# Patient Record
Sex: FEMALE | Race: WHITE | Employment: OTHER | URBAN - METROPOLITAN AREA
[De-identification: names, ages, dates, MRNs, and addresses within clinical notes are randomized per-mention and may not be internally consistent; named-entity substitution may affect disease eponyms.]

---

## 2017-05-16 ENCOUNTER — OFFICE VISIT (OUTPATIENT)
Dept: CARDIOLOGY CLINIC | Age: 77
End: 2017-05-16

## 2017-05-16 VITALS
SYSTOLIC BLOOD PRESSURE: 126 MMHG | RESPIRATION RATE: 18 BRPM | OXYGEN SATURATION: 93 % | DIASTOLIC BLOOD PRESSURE: 80 MMHG | HEART RATE: 62 BPM | WEIGHT: 245 LBS | HEIGHT: 63 IN | BODY MASS INDEX: 43.41 KG/M2

## 2017-05-16 DIAGNOSIS — G47.30 SLEEP APNEA, UNSPECIFIED TYPE: ICD-10-CM

## 2017-05-16 DIAGNOSIS — E78.5 DYSLIPIDEMIA: Primary | ICD-10-CM

## 2017-05-16 DIAGNOSIS — I25.10 ATHEROSCLEROSIS OF NATIVE CORONARY ARTERY OF NATIVE HEART WITHOUT ANGINA PECTORIS: ICD-10-CM

## 2017-05-16 NOTE — MR AVS SNAPSHOT
Visit Information Date & Time Provider Department Dept. Phone Encounter #  
 5/16/2017  3:20 PM Emely Samson MD [facility] Hang [phone] [id number] Follow-up Instructions Return in about 6 months (around 11/16/2017). Follow-up and Disposition History Your Appointments 5/16/2017  3:20 PM  
ESTABLISHED PATIENT with Emely Samson MD  
[facility] (JEAN CLAUDE SCHEDULING) Appt Note: annual cardiac checkup [address] [facility] [address] 100[address] [facility]  
54 [facility] [address] [address] Upcoming Health Maintenance Date Due DTaP/Tdap/Td series (1 - Tdap) 2/6/1961 ZOSTER VACCINE AGE 60> 2/6/2000 GLAUCOMA SCREENING Q2Y 2/6/2005 OSTEOPOROSIS SCREENING (DEXA) 2/6/2005 Pneumococcal 65+ Low/Medium Risk (1 of 2 - PCV13) 2/6/2005 MEDICARE YEARLY EXAM 2/6/2005 INFLUENZA AGE 9 TO ADULT 8/1/2017 Allergies as of 5/16/2017  Review Complete On: 5/16/2017 By: Emely Samson MD  
 No Known Allergies Current Immunizations  Never Reviewed No immunizations on file. Not reviewed this visit You Were Diagnosed With   
  
 Codes Comments Dyslipidemia    -  Primary ICD-10-CM: E78.5 ICD-9-CM: 272.4 Atherosclerosis of native coronary artery of native heart without angina pectoris     ICD-10-CM: I25.10 ICD-9-CM: 414.01 Sleep apnea, unspecified type     ICD-10-CM: G47.30 ICD-9-CM: 780.57 Vitals BP Pulse Resp Height(growth percentile) Weight(growth percentile) SpO2  
 126/80 (BP 1 Location: Left arm, BP Patient Position: Sitting) 62 18 5' 3\" (1.6 m) 245 lb (111.1 kg) 93% BMI Smoking Status 43.4 kg/m2 Never Smoker Vitals History BMI and BSA Data Body Mass Index Body Surface Area  
 43.4 kg/m 2 2.22 m 2 Preferred Pharmacy Pharmacy Name Phone [address] [address] [phone] Your Updated Medication List  
  
   
This list is accurate as of: 5/16/17 11:16 AM.  Always use your most recent med list. amLODIPine 2.5 mg tablet Commonly known as:  Heidi David Take 1 Tab by mouth daily. aspirin 81 mg chewable tablet Take 81 mg by mouth daily. BIOTENE Tpst  
Generic drug:  sod mfp-glu yjr-jzsjqyju-rcaps  
by Dental route. cpap machine kit  
by Does Not Apply route. metoprolol succinate 50 mg XL tablet Commonly known as:  TOPROL XL Take 1 Tab by mouth daily. multivitamin capsule Take 1 Cap by mouth daily. NASONEX 50 mcg/actuation nasal spray Generic drug:  mometasone 2 Sprays daily. OSTEO BI-FLEX PO Take  by mouth. REFRESH OPTIVE 0.5-0.9 % Drop Generic drug:  Carboxymethylcellulose-Glycern Apply  to eye. instill 2-3 times daily in both eyes  
  
 rosuvastatin 10 mg tablet Commonly known as:  CRESTOR Take 1 Tab by mouth nightly. VOLTAREN 1 % Gel Generic drug:  diclofenac Apply 4 g to affected area three (3) times daily as needed. We Performed the Following HEPATIC FUNCTION PANEL [92361 CPT(R)] LIPID PANEL [23841 CPT(R)] Follow-up Instructions Return in about 6 months (around 11/16/2017). Introducing [facility] [place] [facility]! Fatimah Vasquez introduces [employer] patient portal. Now you can access parts of your medical record, email your doctor's office, and request medication refills online. 1. In your internet browser, go to [email or web address] 2. Click on the First Time User? Click Here link in the Sign In box. You will see the New Member Sign Up page. 3. Enter your [employer] Access Code exactly as it appears below. You will not need to use this code after youve completed the sign-up process. If you do not sign up before the expiration date, you must request a new code. · [employer] Access Code: [email or web address] Expires: 8/14/2017 11:16 AM 
 
4. Enter the last four digits of your Social Security Number (xxxx) and Date of Birth (mm/dd/yyyy) as indicated and click Submit. You will be taken to the next sign-up page. 5. Create a [employer] ID. This will be your [employer] login ID and cannot be changed, so think of one that is secure and easy to remember. 6. Create a [employer] password. You can change your password at any time. 7. Enter your Password Reset Question and Answer. This can be used at a later time if you forget your password. 8. Enter your e-mail address. You will receive e-mail notification when new information is available in [address] 9. Click Sign Up. You can now view and download portions of your medical record. 10. Click the Download Summary menu link to download a portable copy of your medical information. If you have questions, please visit the Frequently Asked Questions section of the [employer] website. Remember, [employer] is NOT to be used for urgent needs. For medical emergencies, dial 911. Now available from your iPhone and Android! Please provide this summary of care documentation to your next provider. Your primary care clinician is listed as Devyn Peck. If you have any questions after today's visit, please call [phone].

## 2017-05-16 NOTE — PROGRESS NOTES
LAST OFFICE VISIT : 12/5/2016        ICD-10-CM ICD-9-CM   1. Dyslipidemia E78.5 272.4   2. Atherosclerosis of native coronary artery of native heart without angina pectoris I25.10 414.01   3. Sleep apnea, unspecified type G47.30 780.57            Elsie Armas is a 68 y.o. female with hypertension and hyperlipidemia referred for annual follow up. Cardiac risk factors: family history, dyslipidemia, obesity, sedentary life style, hypertension, post-menopausal.  I have personally obtained the history from the patient. HISTORY OF PRESENTING ILLNESS      She is doing well with no cardiac complaints today. She is compliant with CPAP. She monitors her diet. The patient denies chest pain/ shortness of breath, orthopnea, PND, LE edema, palpitations, syncope, presyncope or fatigue. ACTIVE PROBLEM LIST     Patient Active Problem List    Diagnosis Date Noted    Dyslipidemia 12/10/2015    Coronary atherosclerosis of native coronary artery 03/31/2011    Essential hypertension, benign 03/31/2011    Morbid obesity ([address] 03/31/2011    Sleep apnea 03/31/2011    Nonspecific abnormal unspecified cardiovascular function study 03/31/2011           PAST MEDICAL HISTORY     Past Medical History:   Diagnosis Date    Bronchitis     Coronary atherosclerosis of native coronary artery     Essential hypertension, benign     Unspecified sleep apnea            PAST SURGICAL HISTORY     Past Surgical History:   Procedure Laterality Date    ECHO 2D ADULT  2007    LVH, normal LV wall motion and ejection fraction, normal chamber dimensions, mild aortic regurgitation and mild mitral regurgitation. The ejection fraction was 60-65%    HX CHOLECYSTECTOMY      HX HEART CATHETERIZATION  2007    Left ventricular wall motion is normal. Ejection Fraction is estimated at 60%. The catheterization showed non-ostructive coronary artery disease. Minimal plaquing.      STRESS TEST MYOVIEW  2007    reversible defect(s) involving the anterior wall. Gated EF (%): 63          ALLERGIES     No Known Allergies       FAMILY HISTORY     History reviewed. No pertinent family history. negative for cardiac disease       SOCIAL HISTORY     Social History     Social History    Marital status:      Spouse name: N/A    Number of children: N/A    Years of education: N/A     Social History Main Topics    Smoking status: Never Smoker    Smokeless tobacco: None    Alcohol use No    Drug use: None    Sexual activity: Yes     Partners: Male     Other Topics Concern    None     Social History Narrative         MEDICATIONS     Current Outpatient Prescriptions   Medication Sig    rosuvastatin (CRESTOR) 10 mg tablet Take 1 Tab by mouth nightly.  amLODIPine (NORVASC) 2.5 mg tablet Take 1 Tab by mouth daily.  metoprolol succinate (TOPROL XL) 50 mg XL tablet Take 1 Tab by mouth daily.  GLUCOSAMINE HCL/CHONDR STONER A NA (OSTEO BI-FLEX PO) Take  by mouth.  mometasone (NASONEX) 50 mcg/actuation nasal spray 2 Sprays daily.  Carboxymethylcellulose-Glycern (REFRESH OPTIVE) 0.5-0.9 % drop Apply  to eye. instill 2-3 times daily in both eyes    diclofenac (VOLTAREN) 1 % topical gel Apply 4 g to affected area three (3) times daily as needed.  sod mfp-glu ohe-pcbeklpo-xkumk (BIOTENE) tpst by Dental route.  cpap machine kit by Does Not Apply route.  multivitamin capsule Take 1 Cap by mouth daily.  aspirin 81 mg chewable tablet Take 81 mg by mouth daily. No current facility-administered medications for this visit. I have reviewed the nurses notes, vitals, problem list, allergy list, medical history, family, social history and medications. REVIEW OF SYMPTOMS      General: Pt denies excessive weight gain or loss. Pt is able to conduct ADL's  HEENT: Denies blurred vision, headaches, hearing loss, epistaxis and difficulty swallowing.   Respiratory: Denies cough, congestion, shortness of breath, AGUILAR, wheezing or stridor. Cardiovascular: Denies precordial pain, palpitations, edema or PND  Gastrointestinal: Denies poor appetite, indigestion, abdominal pain or blood in stool  Genitourinary: Denies hematuria, dysuria, increased urinary frequency  Musculoskeletal: Denies joint pain or swelling from muscles or joints  Neurologic: Denies tremor, paresthesias, headache, or sensory motor disturbance  Psychiatric: Denies confusion, insomnia, depression  Integumentray: Denies rash, itching or ulcers. Hematologic: Denies easy bruising, bleeding     PHYSICAL EXAMINATION      Vitals:    05/16/17 1040   BP: 126/80   Pulse: 62   Resp: 18   SpO2: 93%   Weight: 245 lb (111.1 kg)   Height: 5' 3\" (1.6 m)     General: Well developed, in no acute distress. HEENT: No jaundice, oral mucosa moist, no oral ulcers  Neck: Supple, no stiffness, no lymphadenopathy, supple  Heart:  Normal S1/S2 negative S3 or S4. Regular, no murmur, gallop or rub, no jugular venous distention  Respiratory: Clear bilaterally x 4, no wheezing or rales  Extremities:  No edema, normal cap refill, no cyanosis. Musculoskeletal: No clubbing, no deformities  Neuro: A&Ox3, speech clear, gait stable, cooperative, no focal neurologic deficits  Skin: Skin color is normal. No rashes or lesions. Non diaphoretic, moist.  Vascular: 2+ pulses symmetric in all extremities           DIAGNOSTIC DATA     1. Lipids  5/27/15- , HDL 51, LDL 90,   12/2/15- , HDL 49, ,   8/31/16- , HDL 49, ,   12/5/16- , HDL 53, LDL 62,     2. Echo  10/2/12- EF 60%, LAE, TR mild  12/5/16- EF 65%, AI mild    3. Cardiolite  10/2/12 -no ischemia    4. Cardiac catheterization   ( 2007 )EF 60% with minimal plaquing      5.  LE Venous Doppler 4/14/14  MILD LEFT LEG VENOUS INSUFFICIENCY       LABORATORY DATA          No results found for: WBC, HGBPOC, HGB, HGBP, HCTPOC, HCT, PHCT, RBCH, PLT, MCV, HGBEXT, HCTEXT, PLTEXT, HGBEXT, HCTEXT, PLTEXT   Lab Results Component Value Date/Time    Sodium 143 11/09/2010 11:05 AM    Potassium 4.6 11/09/2010 11:05 AM    Chloride 104 11/09/2010 11:05 AM    CO2 33 11/09/2010 11:05 AM    Anion gap 6 11/09/2010 11:05 AM    Glucose 108 11/09/2010 11:05 AM    BUN 11 11/09/2010 11:05 AM    Creatinine 0.7 11/09/2010 11:05 AM    BUN/Creatinine ratio 16 11/09/2010 11:05 AM    GFR est AA >60 11/09/2010 11:05 AM    GFR est non-AA >60 11/09/2010 11:05 AM    Calcium 8.7 11/09/2010 11:05 AM    Bilirubin, total 0.6 12/05/2016 12:53 PM    AST (SGOT) 21 12/05/2016 12:53 PM    Alk. phosphatase 73 12/05/2016 12:53 PM    Protein, total 6.1 12/05/2016 12:53 PM    Albumin 3.9 12/05/2016 12:53 PM    Globulin 2.7 07/22/2010 01:59 PM    A-G Ratio 1.4 07/22/2010 01:59 PM    ALT (SGPT) 18 12/05/2016 12:53 PM           ASSESSMENT/RECOMMENDATIONS:.      1. GEMMA on CPAP  -is continuing to wear CPAP although she does state she gets tired but does not go to sleep until 1-2 AM    2. Dyslipidemia  -lipids have been at goal on current medical regimen  -I have given her lab slip to get cholesterol checked again     3. HTN  -BP under good control today    4. Age management  -discussed with her diet, exercise, and the importance of losing weight    5. Follow up in 6 months or PRN      Orders Placed This Encounter    HEPATIC FUNCTION PANEL    LIPID PANEL        Follow-up Disposition:  Return in about 6 months (around 11/16/2017). I have discussed the diagnosis with  Dae Huitron and the intended plan as seen in the above orders. Questions were answered concerning future plans. I have discussed medication side effects and warnings with the patient as well. Thank you,  Piyush Balderas MD for involving me in the care of  Dae Huitron. Please do not hesitate to contact me for further questions/concerns. This note was written by nick Ramsey, as dictated by Arminda Fuentes MD.      Liv Thompson. MD [place], [address] [facility] [address] [facility], [address] [place] [address]     Kaveh Guzman [address]      [phone] / [phone] Fax

## 2017-05-16 NOTE — COMMUNICATION BODY
Walter Macdonald,    I had the opportunity of seeing Ms. Lakshmi Bush in the office today. She has lost 7 lbs. Appears deconditioned and I am unsure about if she is truly active or proactive regarding her health. She is tired a lot but will go to bed late at night. She is wearing her CPAP regularly. I will check her FLP but no other cardiac testing today.     All the best,    [facility]

## 2017-05-16 NOTE — PROGRESS NOTES
Visit Vitals    /80 (BP 1 Location: Left arm, BP Patient Position: Sitting)    Pulse 62    Resp 18    Ht 5' 3\" (1.6 m)    Wt 245 lb (111.1 kg)    SpO2 93%    BMI 43.4 kg/m2     Chief Complaint   Patient presents with    Check Up     cardiac check up    Hypertension     No refill, No complaints

## 2017-05-17 LAB
ALBUMIN SERPL-MCNC: 4 G/DL (ref 3.5–4.8)
ALP SERPL-CCNC: 78 IU/L (ref 39–117)
ALT SERPL-CCNC: 15 IU/L (ref 0–32)
AST SERPL-CCNC: 20 IU/L (ref 0–40)
BILIRUB DIRECT SERPL-MCNC: 0.18 MG/DL (ref 0–0.4)
BILIRUB SERPL-MCNC: 0.6 MG/DL (ref 0–1.2)
CHOLEST SERPL-MCNC: 132 MG/DL (ref 100–199)
HDLC SERPL-MCNC: 50 MG/DL
INTERPRETATION, 910389: NORMAL
LDLC SERPL CALC-MCNC: 61 MG/DL (ref 0–99)
PROT SERPL-MCNC: 6 G/DL (ref 6–8.5)
TRIGL SERPL-MCNC: 105 MG/DL (ref 0–149)
VLDLC SERPL CALC-MCNC: 21 MG/DL (ref 5–40)

## 2017-05-23 ENCOUNTER — TELEPHONE (OUTPATIENT)
Dept: CARDIOLOGY CLINIC | Age: 77
End: 2017-05-23

## 2017-05-23 DIAGNOSIS — E78.00 ELEVATED CHOLESTEROL: Primary | ICD-10-CM

## 2017-09-29 ENCOUNTER — TELEPHONE (OUTPATIENT)
Dept: CARDIOLOGY CLINIC | Age: 77
End: 2017-09-29

## 2017-09-29 NOTE — TELEPHONE ENCOUNTER
Pt called back and mentioned she had not had anything to drink today but she did take her meds. She also stated that she does not have a BP cuff at home. I advised her to drink something and hydrate herself. If she wanted she could come by the office and I could take her pressure for her or she could go to another pharmacy and check her pressure there. Pt confirmed understanding. Pt was asymptomatic.

## 2017-09-29 NOTE — TELEPHONE ENCOUNTER
Patient states that she checked her bp while at the pharmacy and it was 196/86. Requests a call back at [phone]. Thanks!

## 2017-09-29 NOTE — TELEPHONE ENCOUNTER
Called pt and left message. Asked her:    Was she hydrated? Was the cuff automatic? Did you take it correctly with legs flat on floor and posture erect? Did you take your meds? Are you stressed? Can you check at home? If so, take a log over the weekend. Take it twice a day and call us back on Monday. If she feels it is really urgent she can make a BP check appt on today.

## 2017-10-23 RX ORDER — METOPROLOL SUCCINATE 50 MG/1
50 TABLET, EXTENDED RELEASE ORAL DAILY
Qty: 90 TAB | Refills: 1 | Status: SHIPPED | OUTPATIENT
Start: 2017-10-23 | End: 2018-05-30 | Stop reason: SDUPTHER

## 2017-11-08 ENCOUNTER — OFFICE VISIT (OUTPATIENT)
Dept: CARDIOLOGY CLINIC | Age: 77
End: 2017-11-08

## 2017-11-08 VITALS
OXYGEN SATURATION: 96 % | HEIGHT: 63 IN | SYSTOLIC BLOOD PRESSURE: 130 MMHG | WEIGHT: 236.4 LBS | BODY MASS INDEX: 41.89 KG/M2 | HEART RATE: 56 BPM | DIASTOLIC BLOOD PRESSURE: 60 MMHG

## 2017-11-08 DIAGNOSIS — E78.5 DYSLIPIDEMIA: ICD-10-CM

## 2017-11-08 DIAGNOSIS — G47.33 OBSTRUCTIVE SLEEP APNEA SYNDROME: ICD-10-CM

## 2017-11-08 DIAGNOSIS — I25.10 ATHEROSCLEROSIS OF NATIVE CORONARY ARTERY OF NATIVE HEART WITHOUT ANGINA PECTORIS: Primary | ICD-10-CM

## 2017-11-08 DIAGNOSIS — I10 ESSENTIAL HYPERTENSION, BENIGN: ICD-10-CM

## 2017-11-08 NOTE — PROGRESS NOTES
Visit Vitals    /60 (BP 1 Location: Left arm)    Pulse (!) 56    Ht 5' 3\" (1.6 m)    Wt 236 lb 6.4 oz (107.2 kg)    SpO2 96%    BMI 41.88 kg/m2

## 2017-11-08 NOTE — PROGRESS NOTES
LAST OFFICE VISIT : 5/16/2017        ICD-10-CM ICD-9-CM   1. Atherosclerosis of native coronary artery of native heart without angina pectoris I25.10 414.01   2. Essential hypertension, benign I10 401.1   3. Dyslipidemia E78.5 272.4   4. Obstructive sleep apnea syndrome G47.33 327.23        Miguel Angel Mobley is a 68 y.o. female with  with hypertension and hyperlipidemia referred for 6 month follow up.       Cardiac risk factors: family history, dyslipidemia, obesity, sedentary life style, hypertension, post-menopausal.I have personally obtained the history from the patient. Rochelle Evans has lost almost 10 lbs since last visit by not eating as much. She does not regularly exercise but occasionally walks with no exertional sxs. The patient denies chest pain/ shortness of breath, orthopnea, PND, LE edema, palpitations, syncope, presyncope or fatigue. ACTIVE PROBLEM LIST     Patient Active Problem List    Diagnosis Date Noted    Dyslipidemia 12/10/2015    Coronary atherosclerosis of native coronary artery 03/31/2011    Essential hypertension, benign 03/31/2011    Morbid obesity ([address] 03/31/2011    Sleep apnea 03/31/2011    Nonspecific abnormal unspecified cardiovascular function study 03/31/2011           PAST MEDICAL HISTORY     Past Medical History:   Diagnosis Date    Bronchitis     Coronary atherosclerosis of native coronary artery     Essential hypertension, benign     Unspecified sleep apnea            PAST SURGICAL HISTORY     Past Surgical History:   Procedure Laterality Date    ECHO 2D ADULT  2007    LVH, normal LV wall motion and ejection fraction, normal chamber dimensions, mild aortic regurgitation and mild mitral regurgitation. The ejection fraction was 60-65%    HX CHOLECYSTECTOMY      HX HEART CATHETERIZATION  2007    Left ventricular wall motion is normal. Ejection Fraction is estimated at 60%.  The catheterization showed non-ostructive coronary artery disease. Minimal plaquing.  STRESS TEST MYOVIEW  2007    reversible defect(s) involving the anterior wall. Gated EF (%): 63          ALLERGIES     No Known Allergies       FAMILY HISTORY     No family history on file. negative for cardiac disease       SOCIAL HISTORY     Social History     Social History    Marital status:      Spouse name: N/A    Number of children: N/A    Years of education: N/A     Social History Main Topics    Smoking status: Never Smoker    Smokeless tobacco: Never Used    Alcohol use No    Drug use: None    Sexual activity: Yes     Partners: Male     Other Topics Concern    None     Social History Narrative         MEDICATIONS     Current Outpatient Prescriptions   Medication Sig    metoprolol succinate (TOPROL XL) 50 mg XL tablet Take 1 Tab by mouth daily.  rosuvastatin (CRESTOR) 10 mg tablet Take 1 Tab by mouth nightly.  amLODIPine (NORVASC) 2.5 mg tablet Take 1 Tab by mouth daily.  GLUCOSAMINE HCL/CHONDR STONER A NA (OSTEO BI-FLEX PO) Take  by mouth as needed.  mometasone (NASONEX) 50 mcg/actuation nasal spray 2 Sprays daily.  Carboxymethylcellulose-Glycern (REFRESH OPTIVE) 0.5-0.9 % drop Apply  to eye. instill 2-3 times daily in both eyes    diclofenac (VOLTAREN) 1 % topical gel Apply 4 g to affected area three (3) times daily as needed.  sod mfp-glu plg-nuudmuui-aamrp (BIOTENE) tpst by Dental route.  cpap machine kit by Does Not Apply route.  multivitamin capsule Take 1 Cap by mouth daily.  aspirin 81 mg chewable tablet Take 81 mg by mouth daily. No current facility-administered medications for this visit. I have reviewed the nurses notes, vitals, problem list, allergy list, medical history, family, social history and medications. REVIEW OF SYMPTOMS      General: Pt denies excessive weight gain or loss.  Pt is able to conduct ADL's  HEENT: Denies blurred vision, headaches, hearing loss, epistaxis and difficulty swallowing. Respiratory: Denies cough, congestion, shortness of breath, AGUILAR, wheezing or stridor. Cardiovascular: Denies precordial pain, palpitations, edema or PND  Gastrointestinal: Denies poor appetite, indigestion, abdominal pain or blood in stool  Genitourinary: Denies hematuria, dysuria, increased urinary frequency  Musculoskeletal: Denies joint pain or swelling from muscles or joints  Neurologic: Denies tremor, paresthesias, headache, or sensory motor disturbance  Psychiatric: Denies confusion, insomnia, depression  Integumentray: Denies rash, itching or ulcers. Hematologic: Denies easy bruising, bleeding     PHYSICAL EXAMINATION      Vitals:    11/08/17 1442   BP: 130/60   Pulse: (!) 56   SpO2: 96%   Weight: 236 lb 6.4 oz (107.2 kg)   Height: 5' 3\" (1.6 m)     General: Well developed, in no acute distress. HEENT: No jaundice, oral mucosa moist, no oral ulcers  Neck: Supple, no stiffness, no lymphadenopathy, supple  Heart:  Normal S1/S2 negative S3 or S4. Regular, no murmur, gallop or rub, no jugular venous distention  Respiratory: Clear bilaterally x 4, no wheezing or rales  Extremities:  No edema, normal cap refill, no cyanosis. Musculoskeletal: No clubbing, no deformities  Neuro: A&Ox3, speech clear, gait stable, cooperative, no focal neurologic deficits  Skin: Skin color is normal. No rashes or lesions. Non diaphoretic, moist.          EKG: SB     DIAGNOSTIC DATA     1. Lipids  5/27/15- , HDL 51, LDL 90,   12/2/15- , HDL 49, ,   8/31/16- , HDL 49, ,   12/5/16- , HDL 53, LDL 62,   5/16/17- , HDL 50, LDL 61,   8/30/17- , HDL 48, ,     2. Echo  10/2/12- EF 60%, LAE, TR mild  12/5/16- EF 65%, AI mild    3. Cardiolite  10/2/12 -no ischemia    4. Cardiac catheterization   ( 2007 )EF 60% with minimal plaquing      5.  LE Venous Doppler 4/14/14  MILD LEFT LEG VENOUS INSUFFICIENCY         LABORATORY DATA          No results found for: WBC, HGBPOC, HGB, HGBP, HCTPOC, HCT, PHCT, RBCH, PLT, MCV, HGBEXT, HCTEXT, PLTEXT, HGBEXT, HCTEXT, PLTEXT   Lab Results   Component Value Date/Time    Sodium 143 11/09/2010 11:05 AM    Potassium 4.6 11/09/2010 11:05 AM    Chloride 104 11/09/2010 11:05 AM    CO2 33 11/09/2010 11:05 AM    Anion gap 6 11/09/2010 11:05 AM    Glucose 108 11/09/2010 11:05 AM    BUN 11 11/09/2010 11:05 AM    Creatinine 0.7 11/09/2010 11:05 AM    BUN/Creatinine ratio 16 11/09/2010 11:05 AM    GFR est AA >60 11/09/2010 11:05 AM    GFR est non-AA >60 11/09/2010 11:05 AM    Calcium 8.7 11/09/2010 11:05 AM    Bilirubin, total 0.6 05/16/2017 11:31 AM    AST (SGOT) 20 05/16/2017 11:31 AM    Alk. phosphatase 78 05/16/2017 11:31 AM    Protein, total 6.0 05/16/2017 11:31 AM    Albumin 4.0 05/16/2017 11:31 AM    Globulin 2.7 07/22/2010 01:59 PM    A-G Ratio 1.4 07/22/2010 01:59 PM    ALT (SGPT) 15 05/16/2017 11:31 AM           ASSESSMENT/RECOMMENDATIONS:.      1. Dyslipidemia  -lipids at goal. Followed by PCP     2. HTN  -BP under good control today but HR slightly slow on Metoprolol but she is asx. Will hold off on changing dose until she becomes symptomatic.      3. GEMMA on CPAP  -is continuing to wear CPAP although she does state she gets tired but does not go to sleep until 1-2 AM     4. Follow up in 6 months or PRN    Orders Placed This Encounter    AMB POC EKG ROUTINE W/ 12 LEADS, INTER & REP     Order Specific Question:   Reason for Exam:     Answer:   CAD        Follow-up Disposition:  Return in about 6 months (around 5/8/2018). I have discussed the diagnosis with  Anand Mccarthy and the intended plan as seen in the above orders. Questions were answered concerning future plans. I have discussed medication side effects and warnings with the patient as well. Thank you,  Hope Ceja MD for involving me in the care of  Anand Mccarthy. Please do not hesitate to contact me for further questions/concerns.      Written by Lindsay Irvin, dictated by Rabia Mcgee MD.    Nicanor Okeefe MD, [address] [place] [address] Rd., [address]      [facility], [address] [place] [address], [facility] [address]. Jacobo Moura.      [phone] / [phone] Fax

## 2017-11-08 NOTE — MR AVS SNAPSHOT
Visit Information Date & Time Provider Department Dept. Phone Encounter #  
 11/8/2017  2:40 PM Darrian Tena MD [facility] Juany Romero [phone] [id number] Follow-up Instructions Return in about 6 months (around 5/8/2018). Upcoming Health Maintenance Date Due DTaP/Tdap/Td series (1 - Tdap) 2/6/1961 ZOSTER VACCINE AGE 60> 12/6/1999 GLAUCOMA SCREENING Q2Y 2/6/2005 OSTEOPOROSIS SCREENING (DEXA) 2/6/2005 Pneumococcal 65+ Low/Medium Risk (1 of 2 - PCV13) 2/6/2005 MEDICARE YEARLY EXAM 2/6/2005 Influenza Age 5 to Adult 8/1/2017 Allergies as of 11/8/2017  Review Complete On: 11/8/2017 By: Darrian Tena MD  
 No Known Allergies Current Immunizations  Never Reviewed No immunizations on file. Not reviewed this visit You Were Diagnosed With   
  
 Codes Comments Atherosclerosis of native coronary artery of native heart without angina pectoris    -  Primary ICD-10-CM: I25.10 ICD-9-CM: 414.01 Essential hypertension, benign     ICD-10-CM: I10 
ICD-9-CM: 401.1 Dyslipidemia     ICD-10-CM: E78.5 ICD-9-CM: 272.4 Obstructive sleep apnea syndrome     ICD-10-CM: G47.33 
ICD-9-CM: 327.23 Vitals BP Pulse Height(growth percentile) Weight(growth percentile) SpO2 BMI  
 130/60 (BP 1 Location: Left arm) (!) 56 5' 3\" (1.6 m) 236 lb 6.4 oz (107.2 kg) 96% 41.88 kg/m2 Smoking Status Never Smoker Vitals History BMI and BSA Data Body Mass Index Body Surface Area  
 41.88 kg/m 2 2.18 m 2 Preferred Pharmacy Pharmacy Name Phone [address] [facility]  [facility] [address] [phone] Your Updated Medication List  
  
   
This list is accurate as of: 11/8/17  3:09 PM.  Always use your most recent med list. amLODIPine 2.5 mg tablet Commonly known as:  Uri Morgan Take 1 Tab by mouth daily. aspirin 81 mg chewable tablet Take 81 mg by mouth daily. BIOTENE Tpst  
Generic drug:  sod mfp-glu [id number]-jtmhz  
by Dental route. cpap machine kit  
by Does Not Apply route. metoprolol succinate 50 mg XL tablet Commonly known as:  TOPROL XL Take 1 Tab by mouth daily. multivitamin capsule Take 1 Cap by mouth daily. NASONEX 50 mcg/actuation nasal spray Generic drug:  mometasone 2 Sprays daily. OSTEO BI-FLEX PO Take  by mouth as needed. REFRESH OPTIVE 0.5-0.9 % Drop Generic drug:  Carboxymethylcellulose-Glycern Apply  to eye. instill 2-3 times daily in both eyes  
  
 rosuvastatin 10 mg tablet Commonly known as:  CRESTOR Take 1 Tab by mouth nightly. VOLTAREN 1 % Gel Generic drug:  diclofenac Apply 4 g to affected area three (3) times daily as needed. We Performed the Following AMB POC EKG ROUTINE W/ 12 LEADS, INTER & REP [24784 CPT(R)] Follow-up Instructions Return in about 6 months (around 5/8/2018). Introducing hospitals & HEALTH SERVICES! Hemant Garcia introduces ClaimKit patient portal. Now you can access parts of your medical record, email your doctor's office, and request medication refills online. 1. In your internet browser, go to [email or web address] 2. Click on the First Time User? Click Here link in the Sign In box. You will see the New Member Sign Up page. 3. Enter your ClaimKit Access Code exactly as it appears below. You will not need to use this code after youve completed the sign-up process. If you do not sign up before the expiration date, you must request a new code. · ClaimKit Access Code: [email or web address] Expires: 2/6/2018  2:28 PM 
 
4. Enter the last four digits of your Social Security Number (xxxx) and Date of Birth (mm/dd/yyyy) as indicated and click Submit. You will be taken to the next sign-up page. 5. Create a ClaimKit ID.  This will be your ClaimKit login ID and cannot be changed, so think of one that is secure and easy to remember. 6. Create a NEBOTRADE password. You can change your password at any time. 7. Enter your Password Reset Question and Answer. This can be used at a later time if you forget your password. 8. Enter your e-mail address. You will receive e-mail notification when new information is available in [address] 9. Click Sign Up. You can now view and download portions of your medical record. 10. Click the Download Summary menu link to download a portable copy of your medical information. If you have questions, please visit the Frequently Asked Questions section of the NEBOTRADE website. Remember, NEBOTRADE is NOT to be used for urgent needs. For medical emergencies, dial 911. Now available from your iPhone and Android! Please provide this summary of care documentation to your next provider. Your primary care clinician is listed as Lorrie Tong. If you have any questions after today's visit, please call [phone].

## 2017-12-12 RX ORDER — AMLODIPINE BESYLATE 2.5 MG/1
2.5 TABLET ORAL DAILY
Qty: 90 TAB | Refills: 3 | Status: SHIPPED | OUTPATIENT
Start: 2017-12-12

## 2018-05-07 ENCOUNTER — TELEPHONE (OUTPATIENT)
Dept: CARDIOLOGY CLINIC | Age: 78
End: 2018-05-07

## 2018-05-07 NOTE — TELEPHONE ENCOUNTER
Patient is unsure if she was supposed to get labwork done prior to her appt. 5/9 She can be reached at [phone].  Lisandro garcia

## 2018-05-09 ENCOUNTER — OFFICE VISIT (OUTPATIENT)
Dept: CARDIOLOGY CLINIC | Age: 78
End: 2018-05-09

## 2018-05-09 VITALS
BODY MASS INDEX: 41.64 KG/M2 | HEART RATE: 59 BPM | OXYGEN SATURATION: 98 % | HEIGHT: 63 IN | DIASTOLIC BLOOD PRESSURE: 82 MMHG | SYSTOLIC BLOOD PRESSURE: 130 MMHG | WEIGHT: 235 LBS | RESPIRATION RATE: 18 BRPM

## 2018-05-09 DIAGNOSIS — I10 ESSENTIAL HYPERTENSION, BENIGN: ICD-10-CM

## 2018-05-09 DIAGNOSIS — I25.10 ATHEROSCLEROSIS OF NATIVE CORONARY ARTERY OF NATIVE HEART WITHOUT ANGINA PECTORIS: Primary | ICD-10-CM

## 2018-05-09 DIAGNOSIS — E78.5 DYSLIPIDEMIA: ICD-10-CM

## 2018-05-09 DIAGNOSIS — E66.01 MORBID OBESITY (HCC): ICD-10-CM

## 2018-05-09 DIAGNOSIS — G47.33 OBSTRUCTIVE SLEEP APNEA SYNDROME: ICD-10-CM

## 2018-05-09 NOTE — PROGRESS NOTES
LAST OFFICE VISIT : 11/8/2017        ICD-10-CM ICD-9-CM   1. Atherosclerosis of native coronary artery of native heart without angina pectoris I25.10 414.01   2. Dyslipidemia E78.5 272.4   3. Essential hypertension, benign I10 401.1   4. Morbid obesity ([address] E66.01 278.01   5. Obstructive sleep apnea syndrome G47.33 327.23       Deshawn Gallardo is a 66 y.o. female  with hypertension and hyperlipidemia referred for 6 month follow up.       Cardiac risk factors: family history, dyslipidemia, obesity, sedentary life style, hypertension, post-menopausal  I have personally obtained the history from the patient. Chris Madrid      Ms. Deborah Conteh feels well overall although had an isolated spell of nonexertional, right-sided chest pain last night. She denies any exertional sxs. She stopped her CPAP as she thought the water was bothering her stomach. She stopped her statin 2 days ago as she heard these are bad. She notes she ate chicken salad with onion rings and coke for lunch. She has been eating frozen yogurt instead of ice cream.     She stopped ASA 2 days ago as she is scheduled for a colonoscopy and endoscopy for Friday. She also notes spots on her right arm which previously had resolved after applying a cream to these. The patient denies shortness of breath, orthopnea, LE edema, palpitations, syncope, presyncope or fatigue.        ACTIVE PROBLEM LIST     Patient Active Problem List    Diagnosis Date Noted    Dyslipidemia 12/10/2015    Coronary atherosclerosis of native coronary artery 03/31/2011    Essential hypertension, benign 03/31/2011    Morbid obesity ([facility] [address] 03/31/2011    Sleep apnea 03/31/2011    Nonspecific abnormal unspecified cardiovascular function study 03/31/2011           PAST MEDICAL HISTORY     Past Medical History:   Diagnosis Date    Bronchitis     Coronary atherosclerosis of native coronary artery     Essential hypertension, benign     Unspecified sleep apnea PAST SURGICAL HISTORY     Past Surgical History:   Procedure Laterality Date    ECHO 2D ADULT  2007    LVH, normal LV wall motion and ejection fraction, normal chamber dimensions, mild aortic regurgitation and mild mitral regurgitation. The ejection fraction was 60-65%    HX CHOLECYSTECTOMY      HX HEART CATHETERIZATION  2007    Left ventricular wall motion is normal. Ejection Fraction is estimated at 60%. The catheterization showed non-ostructive coronary artery disease. Minimal plaquing.  STRESS TEST MYOVIEW  2007    reversible defect(s) involving the anterior wall. Gated EF (%): 63          ALLERGIES     No Known Allergies       FAMILY HISTORY     History reviewed. No pertinent family history. negative for cardiac disease       SOCIAL HISTORY     Social History     Social History    Marital status:      Spouse name: N/A    Number of children: N/A    Years of education: N/A     Social History Main Topics    Smoking status: Never Smoker    Smokeless tobacco: Never Used    Alcohol use No    Drug use: No    Sexual activity: Yes     Partners: Male     Other Topics Concern    None     Social History Narrative         MEDICATIONS     Current Outpatient Prescriptions   Medication Sig    amLODIPine (NORVASC) 2.5 mg tablet Take 1 Tab by mouth daily.  metoprolol succinate (TOPROL XL) 50 mg XL tablet Take 1 Tab by mouth daily.  rosuvastatin (CRESTOR) 10 mg tablet Take 1 Tab by mouth nightly.  GLUCOSAMINE HCL/CHONDR STONER A NA (OSTEO BI-FLEX PO) Take  by mouth as needed.  mometasone (NASONEX) 50 mcg/actuation nasal spray 2 Sprays daily.  Carboxymethylcellulose-Glycern (REFRESH OPTIVE) 0.5-0.9 % drop Apply  to eye. instill 2-3 times daily in both eyes    diclofenac (VOLTAREN) 1 % topical gel Apply 4 g to affected area three (3) times daily as needed.  sod mfp-glu kfd-xtzmgovk-xahgq (BIOTENE) tpst by Dental route.  multivitamin capsule Take 1 Cap by mouth daily.     aspirin 81 mg chewable tablet Take 81 mg by mouth daily.  cpap machine kit by Does Not Apply route. No current facility-administered medications for this visit. I have reviewed the nurses notes, vitals, problem list, allergy list, medical history, family, social history and medications. REVIEW OF SYMPTOMS      General: Pt denies excessive weight gain or loss. Pt is able to conduct ADL's  HEENT: Denies blurred vision, headaches, hearing loss, epistaxis and difficulty swallowing. Respiratory: Denies cough, congestion, shortness of breath, AGUILAR, wheezing or stridor. Cardiovascular: Denies palpitations, edema or PND +isolated episode of nonexertional, right-sided chest pain  Gastrointestinal: Denies poor appetite, indigestion, abdominal pain or blood in stool  Genitourinary: Denies hematuria, dysuria, increased urinary frequency  Musculoskeletal: Denies joint pain or swelling from muscles or joints  Neurologic: Denies tremor, paresthesias, headache, or sensory motor disturbance  Psychiatric: Denies confusion, insomnia, depression  Integumentray: Denies itching or ulcers. +spots on right arm  Hematologic: Denies easy bruising, bleeding     PHYSICAL EXAMINATION      Vitals:    05/09/18 1425   BP: 130/82   Pulse: (!) 59   Resp: 18   SpO2: 98%   Weight: 235 lb (106.6 kg)   Height: 5' 3\" (1.6 m)     General: Well developed, in no acute distress. HEENT: No jaundice, oral mucosa moist, no oral ulcers  Neck: Supple, no stiffness, no lymphadenopathy, supple  Heart:  Normal S1/S2 negative S3 or S4. Regular, no murmur, gallop or rub, no jugular venous distention  Respiratory: Clear bilaterally x 4, no wheezing or rales   Extremities:  No edema, normal cap refill, no cyanosis. Musculoskeletal: No clubbing, no deformities  Neuro: A&Ox3, speech clear, gait stable, cooperative, no focal neurologic deficits  Skin: Skin color is normal. No rashes or lesions. Non diaphoretic, moist.   DIAGNOSTIC DATA     1.  Lipids  5/27/15- , HDL 51, LDL 90,   12/2/15- , HDL 49, ,   8/31/16- , HDL 49, ,   12/5/16- , HDL 53, LDL 62,   5/16/17- , HDL 50, LDL 61,   8/30/17- , HDL 48, ,   1/23/18- , HDL 46, LDL 76,     2. Echo  10/2/12- EF 60%, LAE, TR mild  12/5/16- EF 65%, AI mild    3. Cardiolite  10/2/12 -no ischemia    4. Cardiac catheterization   ( 2007 )EF 60% with minimal plaquing      5. LE Venous Doppler 4/14/14  MILD LEFT LEG VENOUS INSUFFICIENCY       LABORATORY DATA          No results found for: WBC, HGBPOC, HGB, HGBP, HCTPOC, HCT, PHCT, RBCH, PLT, MCV, HGBEXT, HCTEXT, PLTEXT, HGBEXT, HCTEXT, PLTEXT   Lab Results   Component Value Date/Time    Sodium 143 11/09/2010 11:05 AM    Potassium 4.6 11/09/2010 11:05 AM    Chloride 104 11/09/2010 11:05 AM    CO2 33 (H) 11/09/2010 11:05 AM    Anion gap 6 11/09/2010 11:05 AM    Glucose 108 (H) 11/09/2010 11:05 AM    BUN 11 11/09/2010 11:05 AM    Creatinine 0.7 11/09/2010 11:05 AM    BUN/Creatinine ratio 16 11/09/2010 11:05 AM    GFR est AA >60 11/09/2010 11:05 AM    GFR est non-AA >60 11/09/2010 11:05 AM    Calcium 8.7 11/09/2010 11:05 AM    Bilirubin, total 0.6 05/16/2017 11:31 AM    AST (SGOT) 20 05/16/2017 11:31 AM    Alk. phosphatase 78 05/16/2017 11:31 AM    Protein, total 6.0 05/16/2017 11:31 AM    Albumin 4.0 05/16/2017 11:31 AM    Globulin 2.7 07/22/2010 01:59 PM    A-G Ratio 1.4 07/22/2010 01:59 PM    ALT (SGPT) 15 05/16/2017 11:31 AM           ASSESSMENT/RECOMMENDATIONS:.      1. Dyslipidemia  -lipids at goal. Followed by PCP  -she stopped off her statin. I advised her to let her PCP know this.      2. HTN  -BP at goal on current medical regimen. No adjustments in antihypertensives.      3. GEMMA   -she is not using CPAP at this time secondary to stomach issues. She will be having a colonoscopy and endoscopy on Friday.      4.  Follow up in 6 months or PRN    No orders of the defined types were placed in this encounter. Follow-up Disposition:  Return in about 6 months (around 11/9/2018). I have discussed the diagnosis with  Joellen Evangelista and the intended plan as seen in the above orders. Questions were answered concerning future plans. I have discussed medication side effects and warnings with the patient as well. Thank you,  Alexandra Rodriguez MD for involving me in the care of  Joellen Evangelista. Please do not hesitate to contact me for further questions/concerns. Written by Rupesh Kaufman, dictated by Kiki Daley MD.    Gene Okeefe MD, [address] [place] [address] Rd., [address]      [facility], [address] [place] [address] [address]      [phone] / [phone] Fax

## 2018-05-09 NOTE — MR AVS SNAPSHOT
[address] [facility] [address] [facility] 
[phone] Patient: Pamela Vela MRN: [id number] ZQX:[birth year] Visit Information Date & Time Provider Department Dept. Phone Encounter #  
 5/9/2018  2:20 PM Frankey Ide, MD [facility] Prosper Ruff [phone] [id number] Upcoming Health Maintenance Date Due DTaP/Tdap/Td series (1 - Tdap) 2/6/1961 ZOSTER VACCINE AGE 60> 12/6/1999 GLAUCOMA SCREENING Q2Y 2/6/2005 Bone Densitometry (Dexa) Screening 2/6/2005 Pneumococcal 65+ Low/Medium Risk (1 of 2 - PCV13) 2/6/2005 MEDICARE YEARLY EXAM 3/28/2018 Influenza Age 5 to Adult 8/1/2018 Allergies as of 5/9/2018  Review Complete On: 5/9/2018 By: Frankey Ide, MD  
 No Known Allergies Current Immunizations  Never Reviewed No immunizations on file. Not reviewed this visit You Were Diagnosed With   
  
 Codes Comments Atherosclerosis of native coronary artery of native heart without angina pectoris    -  Primary ICD-10-CM: I25.10 ICD-9-CM: 414.01 Dyslipidemia     ICD-10-CM: E78.5 ICD-9-CM: 272.4 Essential hypertension, benign     ICD-10-CM: I10 
ICD-9-CM: 401.1 Morbid obesity ([address]     ICD-10-CM: E66.01 
ICD-9-CM: 278.01 Obstructive sleep apnea syndrome     ICD-10-CM: G47.33 
ICD-9-CM: 327.23 Vitals BP Pulse Resp Height(growth percentile) Weight(growth percentile) SpO2  
 130/82 (BP 1 Location: Right arm, BP Patient Position: Sitting) (!) 59 18 5' 3\" (1.6 m) 235 lb (106.6 kg) 98% BMI Smoking Status 41.63 kg/m2 Never Smoker Vitals History BMI and BSA Data Body Mass Index Body Surface Area  
 41.63 kg/m 2 2.18 m 2 Preferred Pharmacy Pharmacy Name Phone [address] [address] [phone] Your Updated Medication List  
  
   
 This list is accurate as of 5/9/18  2:41 PM.  Always use your most recent med list. amLODIPine 2.5 mg tablet Commonly known as:  Elliott Rings Take 1 Tab by mouth daily. aspirin 81 mg chewable tablet Take 81 mg by mouth daily. BIOTENE Tpst  
Generic drug:  sod mfp-glu txq-voyynapf-cmqra  
by Dental route. cpap machine kit  
by Does Not Apply route. metoprolol succinate 50 mg XL tablet Commonly known as:  TOPROL XL Take 1 Tab by mouth daily. multivitamin capsule Take 1 Cap by mouth daily. NASONEX 50 mcg/actuation nasal spray Generic drug:  mometasone 2 Sprays daily. OSTEO BI-FLEX PO Take  by mouth as needed. REFRESH OPTIVE 0.5-0.9 % Drop Generic drug:  Carboxymethylcellulose-Glycern Apply  to eye. instill 2-3 times daily in both eyes  
  
 rosuvastatin 10 mg tablet Commonly known as:  CRESTOR Take 1 Tab by mouth nightly. VOLTAREN 1 % Gel Generic drug:  diclofenac Apply 4 g to affected area three (3) times daily as needed. Introducing [facility]! Antonio Ramirez introduces [email or web address] patient portal. Now you can access parts of your medical record, email your doctor's office, and request medication refills online. 1. In your internet browser, go to [email or web address] 2. Click on the First Time User? Click Here link in the Sign In box. You will see the New Member Sign Up page. 3. Enter your [email or web address] Access Code exactly as it appears below. You will not need to use this code after youve completed the sign-up process. If you do not sign up before the expiration date, you must request a new code. · [email or web address] Access Code: [email or web address] Expires: 8/7/2018  2:28 PM 
 
4. Enter the last four digits of your Social Security Number (xxxx) and Date of Birth (mm/dd/yyyy) as indicated and click Submit. You will be taken to the next sign-up page. 5. Create a PlayFitness ID. This will be your PlayFitness login ID and cannot be changed, so think of one that is secure and easy to remember. 6. Create a PlayFitness password. You can change your password at any time. 7. Enter your Password Reset Question and Answer. This can be used at a later time if you forget your password. 8. Enter your e-mail address. You will receive e-mail notification when new information is available in [address] 9. Click Sign Up. You can now view and download portions of your medical record. 10. Click the Download Summary menu link to download a portable copy of your medical information. If you have questions, please visit the Frequently Asked Questions section of the PlayFitness website. Remember, PlayFitness is NOT to be used for urgent needs. For medical emergencies, dial 911. Now available from your iPhone and Android! Please provide this summary of care documentation to your next provider. Your primary care clinician is listed as Keny Toro. If you have any questions after today's visit, please call [phone].

## 2018-05-09 NOTE — PROGRESS NOTES
Chief Complaint   Patient presents with    Coronary Artery Disease    Cholesterol Problem    Hypertension     1. Have you been to the ER, urgent care clinic since your last visit? Hospitalized since your last visit?[facility] January 2018 for tx of skin infection. 2. Have you seen or consulted any other health care providers outside of the [address] [place] [address] since your last visit? Include any pap smears or colon screening.  No    Visit Vitals    /82 (BP 1 Location: Right arm, BP Patient Position: Sitting)    Pulse (!) 59    Resp 18    Ht 5' 3\" (1.6 m)    Wt 235 lb (106.6 kg)    SpO2 98%    BMI 41.63 kg/m2

## 2018-05-30 RX ORDER — METOPROLOL SUCCINATE 50 MG/1
50 TABLET, EXTENDED RELEASE ORAL DAILY
Qty: 90 TAB | Refills: 2 | Status: SHIPPED | OUTPATIENT
Start: 2018-05-30 | End: 2020-07-02

## 2018-10-31 ENCOUNTER — OFFICE VISIT (OUTPATIENT)
Dept: CARDIOLOGY CLINIC | Age: 78
End: 2018-10-31

## 2018-10-31 VITALS
DIASTOLIC BLOOD PRESSURE: 88 MMHG | SYSTOLIC BLOOD PRESSURE: 150 MMHG | HEIGHT: 63 IN | HEART RATE: 63 BPM | OXYGEN SATURATION: 96 % | WEIGHT: 231.6 LBS | BODY MASS INDEX: 41.04 KG/M2

## 2018-10-31 DIAGNOSIS — E78.5 DYSLIPIDEMIA: ICD-10-CM

## 2018-10-31 DIAGNOSIS — I10 ESSENTIAL HYPERTENSION, BENIGN: ICD-10-CM

## 2018-10-31 DIAGNOSIS — E78.00 ELEVATED CHOLESTEROL: ICD-10-CM

## 2018-10-31 DIAGNOSIS — G47.33 OBSTRUCTIVE SLEEP APNEA SYNDROME: ICD-10-CM

## 2018-10-31 DIAGNOSIS — I25.10 ATHEROSCLEROSIS OF NATIVE CORONARY ARTERY OF NATIVE HEART WITHOUT ANGINA PECTORIS: Primary | ICD-10-CM

## 2018-10-31 NOTE — PROGRESS NOTES
LAST OFFICE VISIT : 5/09/18      ICD-10-CM ICD-9-CM   1. Atherosclerosis of native coronary artery of native heart without angina pectoris I25.10 414.01   2. Dyslipidemia E78.5 272.4   3. Essential hypertension, benign I10 401.1   4. Obstructive sleep apnea syndrome G47.33 327.23   5. Elevated cholesterol E78.00 272.0     Marlen Whitfield is a 66 y.o. female  with hypertension and hyperlipidemia referred for 6 month follow up.       Cardiac risk factors: family history, dyslipidemia, obesity, sedentary life style, hypertension, post-menopausal  I have personally obtained the history from the patient. Chirag Guadalupe      Ms. Clinton Peacock states she is doing well. However, she states she discontinued her medications. She now takes Metoprolol, Norvasc and Crestor 1x/week. She has not taken any of them since Monday. She denies any exertional chest pain, dyspnea, palpitations, syncope, orthopnea, edema or paroxysmal nocturnal dyspnea. ACTIVE PROBLEM LIST     Patient Active Problem List    Diagnosis Date Noted    Dyslipidemia 12/10/2015    Coronary atherosclerosis of native coronary artery 03/31/2011    Essential hypertension, benign 03/31/2011    Morbid obesity ([address] 03/31/2011    Sleep apnea 03/31/2011    Nonspecific abnormal unspecified cardiovascular function study 03/31/2011           PAST MEDICAL HISTORY     Past Medical History:   Diagnosis Date    Bronchitis     Coronary atherosclerosis of native coronary artery     Essential hypertension, benign     Unspecified sleep apnea            PAST SURGICAL HISTORY     Past Surgical History:   Procedure Laterality Date    ECHO 2D ADULT  2007    LVH, normal LV wall motion and ejection fraction, normal chamber dimensions, mild aortic regurgitation and mild mitral regurgitation.  The ejection fraction was 60-65%    HX CHOLECYSTECTOMY      HX HEART CATHETERIZATION  2007    Left ventricular wall motion is normal. Ejection Fraction is estimated at 60%. The catheterization showed non-ostructive coronary artery disease. Minimal plaquing.  STRESS TEST MYOVIEW  2007    reversible defect(s) involving the anterior wall. Gated EF (%): 63          ALLERGIES     No Known Allergies       FAMILY HISTORY     No family history on file. negative for cardiac disease       SOCIAL HISTORY     Social History     Socioeconomic History    Marital status:      Spouse name: Not on file    Number of children: Not on file    Years of education: Not on file    Highest education level: Not on file   Social Needs    Financial resource strain: Not on file    Food insecurity - worry: Not on file    Food insecurity - inability: Not on file    Transportation needs - medical: Not on file   [employer] needs - non-medical: Not on file   Occupational History    Not on file   Tobacco Use    Smoking status: Never Smoker    Smokeless tobacco: Never Used   Substance and Sexual Activity    Alcohol use: No    Drug use: No    Sexual activity: Yes     Partners: Male   Other Topics Concern    Not on file   Social History Narrative    Not on file         MEDICATIONS     Current Outpatient Medications   Medication Sig    ubidecarenone (COQ-10 PO) Take  by mouth daily.  ascorbate calcium (VITAMIN C PO) Take  by mouth daily.  metoprolol succinate (TOPROL XL) 50 mg XL tablet Take 1 Tab by mouth daily. (Patient taking differently: Take 50 mg by mouth every seven (7) days.)    amLODIPine (NORVASC) 2.5 mg tablet Take 1 Tab by mouth daily. (Patient taking differently: Take 2.5 mg by mouth every seven (7) days.)    rosuvastatin (CRESTOR) 10 mg tablet Take 1 Tab by mouth nightly. (Patient taking differently: Take 10 mg by mouth every seven (7) days.)    GLUCOSAMINE HCL/CHONDR STONER A NA (OSTEO BI-FLEX PO) Take  by mouth as needed.  mometasone (NASONEX) 50 mcg/actuation nasal spray 2 Sprays daily.     Carboxymethylcellulose-Glycern (REFRESH OPTIVE) 0.5-0.9 % drop Apply to eye. instill 2-3 times daily in both eyes    diclofenac (VOLTAREN) 1 % topical gel Apply 4 g to affected area three (3) times daily as needed.  sod mfp-glu ofd-[id number]-gmasv (BIOTENE) tpst by Dental route.  cpap machine kit by Does Not Apply route.  multivitamin capsule Take 1 Cap by mouth daily.  aspirin 81 mg chewable tablet Take 81 mg by mouth daily. No current facility-administered medications for this visit. I have reviewed the nurses notes, vitals, problem list, allergy list, medical history, family, social history and medications. REVIEW OF SYMPTOMS      General: Pt denies excessive weight gain or loss. Pt is able to conduct ADL's  HEENT: Denies blurred vision, headaches, hearing loss, epistaxis and difficulty swallowing. Respiratory: Denies cough, congestion, shortness of breath, AGUILAR, wheezing or stridor. Cardiovascular: Denies palpitations, edema or PND +isolated episode of nonexertional, right-sided chest pain  Gastrointestinal: Denies poor appetite, indigestion, abdominal pain or blood in stool  Genitourinary: Denies hematuria, dysuria, increased urinary frequency  Musculoskeletal: Denies joint pain or swelling from muscles or joints  Neurologic: Denies tremor, paresthesias, headache, or sensory motor disturbance  Psychiatric: Denies confusion, insomnia, depression  Integumentray: Denies itching or ulcers. +spots on right arm  Hematologic: Denies easy bruising, bleeding     PHYSICAL EXAMINATION      Vitals:    10/31/18 1551   BP: 150/88   Pulse: 63   SpO2: 96%   Weight: 231 lb 9.6 oz (105.1 kg)   Height: 5' 3\" (1.6 m)     General: Well developed, in no acute distress. HEENT: No jaundice, oral mucosa moist, no oral ulcers  Neck: Supple, no stiffness, no lymphadenopathy, supple  Heart:  Normal S1/S2 negative S3 or S4.  Regular, no murmur, gallop or rub, no jugular venous distention  Respiratory: Clear bilaterally x 4, no wheezing or rales   Extremities:  No edema, normal cap refill, no cyanosis. Musculoskeletal: Trace edema in her legs. Neuro: A&Ox3, speech clear, gait stable, cooperative, no focal neurologic deficits  Skin: Skin color is normal. No rashes or lesions. Non diaphoretic, moist.   DIAGNOSTIC DATA     1. Lipids  5/27/15- , HDL 51, LDL 90,   12/2/15- , HDL 49, ,   8/31/16- , HDL 49, ,   12/5/16- , HDL 53, LDL 62,   5/16/17- , HDL 50, LDL 61,   8/30/17- , HDL 48, ,   1/23/18- , HDL 46, LDL 76,     2. Echo  10/2/12- EF 60%, LAE, TR mild  12/5/16- EF 65%, AI mild    3. Cardiolite  10/2/12 -no ischemia    4. Cardiac catheterization   ( 2007 )EF 60% with minimal plaquing      5. LE Venous Doppler 4/14/14  MILD LEFT LEG VENOUS INSUFFICIENCY       LABORATORY DATA          No results found for: WBC, HGBPOC, HGB, HGBP, HCTPOC, HCT, PHCT, RBCH, PLT, MCV, HGBEXT, HCTEXT, PLTEXT, HGBEXT, HCTEXT, PLTEXT   Lab Results   Component Value Date/Time    Sodium 143 11/09/2010 11:05 AM    Potassium 4.6 11/09/2010 11:05 AM    Chloride 104 11/09/2010 11:05 AM    CO2 33 (H) 11/09/2010 11:05 AM    Anion gap 6 11/09/2010 11:05 AM    Glucose 108 (H) 11/09/2010 11:05 AM    BUN 11 11/09/2010 11:05 AM    Creatinine 0.7 11/09/2010 11:05 AM    BUN/Creatinine ratio 16 11/09/2010 11:05 AM    GFR est AA >60 11/09/2010 11:05 AM    GFR est non-AA >60 11/09/2010 11:05 AM    Calcium 8.7 11/09/2010 11:05 AM    Bilirubin, total 0.6 05/16/2017 11:31 AM    AST (SGOT) 20 05/16/2017 11:31 AM    Alk. phosphatase 78 05/16/2017 11:31 AM    Protein, total 6.0 05/16/2017 11:31 AM    Albumin 4.0 05/16/2017 11:31 AM    Globulin 2.7 07/22/2010 01:59 PM    A-G Ratio 1.4 07/22/2010 01:59 PM    ALT (SGPT) 15 05/16/2017 11:31 AM           ASSESSMENT/RECOMMENDATIONS:.      1. Dyslipidemia  - Lipids are followed by PCP, and were previously at goal.   - She is now only taking Crestor 1x/week.  I encouraged her to resume her Crestor every day.       2. HTN  - BP is slightly elevated today. - She is only taking her antihypertensive medications 1x/week. - I urged her to resume Metoprolol XL daily. She can hold off on Norvasc for now. - Return in 10 days for a blood pressure check.      3. GEMMA   - I am unclear if she is using her CPAP, as I failed to ask today  - It will be essential that she wears it moving forwatd      Follow up in 6 months or PRN    Orders Placed This Encounter    ubidecarenone (COQ-10 PO)     Sig: Take  by mouth daily.  ascorbate calcium (VITAMIN C PO)     Sig: Take  by mouth daily. Follow-up Disposition: Not on File      I have discussed the diagnosis with  Kali Smallwood and the intended plan as seen in the above orders. Questions were answered concerning future plans. I have discussed medication side effects and warnings with the patient as well. Thank you, Lauro Rutherford MD for involving me in the care of  Kali Smallwood. Please do not hesitate to contact me for further questions/concerns. Written by Latoya Bojorquez, dictated by Yolande Ma MD.    Rodger Okeefe MD, [address]      [facility], [address] [place] [address] Drive      [phone] / [phone] Fax

## 2018-10-31 NOTE — PROGRESS NOTES
Lipids done 2017    Patient has been taking METOPROLOL, NORVASC, AND CRESTOR every 7 days instead of daily.     Visit Vitals  /88 (BP 1 Location: Left arm, BP Patient Position: Sitting)   Pulse 63   Ht 5' 3\" (1.6 m)   Wt 231 lb 9.6 oz (105.1 kg)   SpO2 96%   BMI 41.03 kg/m²

## 2018-11-07 ENCOUNTER — CLINICAL SUPPORT (OUTPATIENT)
Dept: CARDIOLOGY CLINIC | Age: 78
End: 2018-11-07

## 2018-11-07 VITALS
HEIGHT: 63 IN | RESPIRATION RATE: 12 BRPM | BODY MASS INDEX: 40.93 KG/M2 | WEIGHT: 231 LBS | DIASTOLIC BLOOD PRESSURE: 90 MMHG | HEART RATE: 62 BPM | SYSTOLIC BLOOD PRESSURE: 160 MMHG | OXYGEN SATURATION: 98 %

## 2018-11-07 DIAGNOSIS — I10 HYPERTENSION, UNSPECIFIED TYPE: Primary | ICD-10-CM

## 2018-11-07 NOTE — PROGRESS NOTES
1. Have you been to the ER, urgent care clinic since your last visit? Hospitalized since your last visit? Yes When: 6/2018 Where: [facility] Reason for visit: GI    2. Have you seen or consulted any other health care providers outside of the [facility] since your last visit? Include any pap smears or colon screening. No    Pt reports Med Rec. Completed. Chief Complaint   Patient presents with    Hypertension     67 y/o female reports to office for 10 day bp check.       Visit Vitals  /90 (BP 1 Location: Right arm, BP Patient Position: Sitting)   Pulse 62   Resp 12   Ht 5' 3\" (1.6 m)   Wt 231 lb (104.8 kg)   SpO2 98%   BMI 40.92 kg/m²

## 2019-12-09 ENCOUNTER — OFFICE VISIT (OUTPATIENT)
Dept: SLEEP MEDICINE | Age: 79
End: 2019-12-09

## 2019-12-09 ENCOUNTER — TELEPHONE (OUTPATIENT)
Dept: SLEEP MEDICINE | Age: 79
End: 2019-12-09

## 2019-12-09 ENCOUNTER — DOCUMENTATION ONLY (OUTPATIENT)
Dept: SLEEP MEDICINE | Age: 79
End: 2019-12-09

## 2019-12-09 VITALS
BODY MASS INDEX: 41.89 KG/M2 | OXYGEN SATURATION: 95 % | HEART RATE: 72 BPM | WEIGHT: 236.4 LBS | HEIGHT: 63 IN | SYSTOLIC BLOOD PRESSURE: 167 MMHG | DIASTOLIC BLOOD PRESSURE: 84 MMHG

## 2019-12-09 DIAGNOSIS — G47.33 OSA (OBSTRUCTIVE SLEEP APNEA): Primary | ICD-10-CM

## 2019-12-09 NOTE — PATIENT INSTRUCTIONS

## 2019-12-09 NOTE — TELEPHONE ENCOUNTER
Patient wants her New device mailed after December 23rd because she will be out of town from Dec. 11 to Dec. 22

## 2019-12-09 NOTE — PROGRESS NOTES
[address] [facility]. [place], [address]  Tel.  [phone]  Fax. [address] [facility] 60  [place], [address] [facility]  Tel.  [phone]  Fax. [phone] [address] Slava Guzman   Tel.  [phone]  Fax. [phone]       Chief Complaint       Chief Complaint   Patient presents with    Sleep Problem     NP former Dr. Narendra Fagan patient        WILBERT Harper is 78 y.o. female seen for evaluation of a sleep disorder. She was initially evaluated at sleep diagnostics. Diagnostic study demonstrated overall AHI of 8.1/h associated with marked arterial desaturations to 59%. During a second study, CPAP was employed at 15 cm. Corresponding AHI 3.1/h. Events were more prominent in rem sleep and associated with significant arterial desaturations. The patient was reevaluated with a polysomnogram demonstrating RDI of 65.1/h; REM related AHI 60/h, minimal SaO2 82%. At 14 cm CPAP corresponding AHI 0.9/h. Oral venting was observed. Most recently CPAP had been at 12 cm. She notes her current unit has not been working recently. Compliance data demonstrated that during the past 180 days, CPAP use during 44 days with the average daily use of 7.3 hours. At 12 cm CPAP average AHI 0.8/h. She notes that with CPAP she is not experiencing significant nocturnal awakening. She normally retires at 1 AM and will awaken at 9: 30 a.m. She may nap several days during the week for 30 minutes. She denies vivid dreaming or nightmares, sleep talking or sleepwalking, bruxism or nocturnal incontinence, abnormal arm or leg movements, hypnagogic hallucinations, sleep paralysis or cataplexy. The patient has not undergone recent diagnostic testing for the current problems. [place] Sleepiness Score: 5       No Known Allergies    Current Outpatient Medications   Medication Sig Dispense Refill    ubidecarenone (COQ-10 PO) Take  by mouth daily.       ascorbate calcium (VITAMIN C PO) Take  by mouth daily.  metoprolol succinate (TOPROL XL) 50 mg XL tablet Take 1 Tab by mouth daily. (Patient taking differently: Take 50 mg by mouth every seven (7) days.) 90 Tab 2    amLODIPine (NORVASC) 2.5 mg tablet Take 1 Tab by mouth daily. (Patient taking differently: Take 2.5 mg by mouth every seven (7) days.) 90 Tab 3    rosuvastatin (CRESTOR) 10 mg tablet Take 1 Tab by mouth nightly. (Patient taking differently: Take 10 mg by mouth every seven (7) days.) 90 Tab 3    GLUCOSAMINE HCL/CHONDR STONER A NA (OSTEO BI-FLEX PO) Take  by mouth as needed.  mometasone (NASONEX) 50 mcg/actuation nasal spray 2 Sprays daily.  Carboxymethylcellulose-Glycern (REFRESH OPTIVE) 0.5-0.9 % drop Apply  to eye. instill 2-3 times daily in both eyes      diclofenac (VOLTAREN) 1 % topical gel Apply 4 g to affected area three (3) times daily as needed.  sod mfp-glu ltk-geavmaoc-sghrz ([employer]) tpst by Dental route.  cpap machine kit by Does Not Apply route.  multivitamin capsule Take 1 Cap by mouth daily.  aspirin 81 mg chewable tablet Take 81 mg by mouth daily. She  has a past medical history of Bronchitis, Coronary atherosclerosis of native coronary artery, Essential hypertension, benign, and Unspecified sleep apnea. She  has a past surgical history that includes stress test myoview (2007); echo 2d adult (2007); hx heart catheterization (2007); and hx cholecystectomy. She family history is not on file. She  reports that she has never smoked. She has never used smokeless tobacco. She reports that she does not drink alcohol or use drugs. Review of Systems:  Review of Systems   Constitutional: Negative for fever. HENT: Negative for hearing loss and tinnitus. Eyes: Negative for double vision. Respiratory: Positive for cough. Cardiovascular: Negative for chest pain and palpitations. Genitourinary: Positive for urgency.    Musculoskeletal: Negative for back pain and neck pain. Skin: Negative for itching and rash. Neurological: Negative for dizziness and headaches. Psychiatric/Behavioral: Negative for depression. Objective:     Visit Vitals  /84 (BP 1 Location: Left arm, BP Patient Position: Sitting)   Pulse 72   Ht 5' 3\" (1.6 m)   Wt 236 lb 6.4 oz (107.2 kg)   SpO2 95%   BMI 41.88 kg/m²     Body mass index is 41.88 kg/m². General:   Conversant, cooperative   Eyes:  Pupils equal and reactive, no nystagmus   Oropharynx:   Mallampati score IV, tongue normal   Tonsils:     Neck:   No carotid bruits; Neck circ. in \"inches\": 15   Chest/Lungs:  Clear on auscultation    CVS:  Normal rate, regular rhythm   Skin:     Neuro:  Speech fluent, face symmetrical, tongue movement normal   Psych:  Normal affect,  normal countenance        Assessment:       ICD-10-CM ICD-9-CM    1. GEMMA (obstructive sleep apnea) G47.33 327.23 AMB SUPPLY ORDER     Sleep disordered breathing responding to CPAP. Unit has not been functioning consistently. Unit will be upgraded. Compliance appointment with the new CPAP will be scheduled. Plan:     Orders Placed This Encounter    AMB SUPPLY ORDER     Diagnosis: Obstructive Sleep Apnea ICD-10 Code (G47.33)     Positive Airway Pressure Therapy: Duration of need: 99 months. ResMed CPAP Device:  Pressure: 12 cmH2O    CPAP mask -  Patient preference, headgear, heated tubing, and filter;  heated humidifier. Wireless modem. Remote monitoring enrollment.  Oral/Nasal Combo Mask 1 every 3 months.  Oral Cushion Combo Mask (Replace) 2 per month.  Nasal Pillows Combo Mask (Replace) 2 per month.  Full Face Mask 1 every 3 months.  Full Face Mask Cushion 1 per month.  Nasal Cushion (Replace) 2 per month.  Nasal Pillows (Replace) 2 per month.  Nasal Interface Mask 1 every 3 months.  Headgear 1 every 6 months.  Chinstrap 1 every 6 months.    Tubing 1 every 3 months.  Filter(s) Disposable 2 per month.  Filter(s) Non-Disposable 1 every 6 months.  Oral Interface 1 every 3 months. [address] for Ketan Hurtado (Replace) 1 every 6 months.  Tubing with heating element 1 every 3 months.                 Sandra Addison MD, [facility]  Diplomate, American Board of Sleep Medicine  NPI [id number]  Electronically signed 12/9/19       * Patient has a history and examination consistent with the diagnosis of sleep apnea. * She was provided information on sleep apnea including corresponding risk factors and the importance of proper treatment. * Treatment options if indicated were reviewed today. Instructions:  o The patient would benefit from weight reduction measures. o Do not engage in activities requiring a normal degree of alertness if fatigue is present. o The patient understands that untreated or undertreated sleep apnea could impair judgement and the ability to function normally during the day.  o Call or return if symptoms worsen or persist.          Sandra Addison MD, [facility]  Electronically signed 12/09/19       This note was created using voice recognition software. Despite editing, there may be syntax errors. This note will not be viewable in [address]

## 2019-12-10 ENCOUNTER — DOCUMENTATION ONLY (OUTPATIENT)
Dept: SLEEP MEDICINE | Age: 79
End: 2019-12-10

## 2020-05-11 ENCOUNTER — DOCUMENTATION ONLY (OUTPATIENT)
Dept: SLEEP MEDICINE | Age: 80
End: 2020-05-11

## 2020-05-11 ENCOUNTER — VIRTUAL VISIT (OUTPATIENT)
Dept: SLEEP MEDICINE | Age: 80
End: 2020-05-11

## 2020-05-11 DIAGNOSIS — G47.33 OSA (OBSTRUCTIVE SLEEP APNEA): Primary | ICD-10-CM

## 2020-05-11 NOTE — PROGRESS NOTES
[address] [facility] [place]. [place], [address]  Tel.  [phone]  Fax. [address] [facility] 60  [address] [facility] [address], [address] [facility]  Tel.  [phone]  Fax. [phone] [address] [facility] [place] [address]  Tel.  [phone]  Fax. [phone]     Darnell Vela is a [de-identified] y.o. female who was seen by synchronous (real-time) audio-video technology on 5/11/2020. Consent:  She and/or her healthcare decision maker is aware that this patient-initiated Telehealth encounter is a billable service, with coverage as determined by her insurance carrier. She is aware that she may receive a bill and has provided verbal consent to proceed: Yes    I was in the office while conducting this encounter. Chief Complaint       No chief complaint on file. HPI        Darnell Vela is a [de-identified] y.o. female seen for follow-up. She [facility]. Diagnostic study demonstrated overall AHI of 8.1/h associated with marked arterial desaturations to 59%. During a second study, CPAP was employed at 15 cm. Corresponding AHI 3.1/h. Events were more prominent in rem sleep and associated with significant arterial desaturations. The patient was reevaluated with a polysomnogram demonstrating RDI of 65.1/h; REM related AHI 60/h, minimal SaO2 82%. At 14 cm CPAP corresponding AHI 0.9/h. Oral venting was observed. Most recently CPAP had been at 12 cm. Unit was upgraded. Compliance data downloaded and reviewed in detail with the patient today. During the past 30 days, CPAP used during 30 days with the average daily use of 7.9 hours. CMS compliance criteria 93%. AHI 1.8 per hour. She notes mouth dryness ; she has been using a nasal mask. Otherwise, she is doing well . No Known Allergies    Current Outpatient Medications   Medication Sig Dispense Refill    ubidecarenone (COQ-10 PO) Take  by mouth daily.  ascorbate calcium (VITAMIN C PO) Take  by mouth daily.  metoprolol succinate (TOPROL XL) 50 mg XL tablet Take 1 Tab by mouth daily. (Patient taking differently: Take 50 mg by mouth every seven (7) days.) 90 Tab 2    amLODIPine (NORVASC) 2.5 mg tablet Take 1 Tab by mouth daily. (Patient taking differently: Take 2.5 mg by mouth every seven (7) days.) 90 Tab 3    rosuvastatin (CRESTOR) 10 mg tablet Take 1 Tab by mouth nightly. (Patient taking differently: Take 10 mg by mouth every seven (7) days.) 90 Tab 3    GLUCOSAMINE HCL/CHONDR STONER A NA (OSTEO BI-FLEX PO) Take  by mouth as needed.  mometasone (NASONEX) 50 mcg/actuation nasal spray 2 Sprays daily.  Carboxymethylcellulose-Glycern (REFRESH OPTIVE) 0.5-0.9 % drop Apply  to eye. instill 2-3 times daily in both eyes      diclofenac (VOLTAREN) 1 % topical gel Apply 4 g to affected area three (3) times daily as needed.  sod mfp-glu pjo-iegypmbg-svcgb ([employer]) tpst by Dental route.  cpap machine kit by Does Not Apply route.  multivitamin capsule Take 1 Cap by mouth daily.  aspirin 81 mg chewable tablet Take 81 mg by mouth daily. She  has a past medical history of Bronchitis, Coronary atherosclerosis of native coronary artery, Essential hypertension, benign, and Unspecified sleep apnea. She  has a past surgical history that includes stress test myoview (2007); echo 2d adult (2007); hx heart catheterization (2007); and hx cholecystectomy. She family history is not on file. She  reports that she has never smoked. She has never used smokeless tobacco. She reports that she does not drink alcohol or use drugs. Review of Systems:  Unchanged per patient      Objective: There were no vitals taken for this visit. There is no height or weight on file to calculate BMI. Due to this being a telemedicine evaluation, certain elements of the physical examination are unable to be assessed.      General:   Conversant, cooperative                                Neuro:  Speech fluent, face symmetrical             Assessment:       ICD-10-CM ICD-9-CM    1. GEMMA (obstructive sleep apnea) G47.33 327.23 AMB SUPPLY ORDER     Sleep disordered breathing responding to CPAP. Has dry mouth, reflecting nasal mask. Will be refitted for a FFM. Will contact the office if this does not improve mouth dryness or for other specific issues. she is compliant with PAP therapy and PAP continues to benefit patient and remains necessary for control of her sleep apnea. Plan:     Orders Placed This Encounter    AMB SUPPLY ORDER     Diagnosis: Obstructive Sleep Apnea ICD-10 Code (G47.33)         ResMed AirFit F30 FFM CPAP mask / Patient preference, headgear, heated tubing, and filter;  heated humidifier. Wireless modem. Remote monitoring enrollment.  Oral/Nasal Combo Mask 1 every 3 months.  Oral Cushion Combo Mask (Replace) 2 per month.  Nasal Pillows Combo Mask (Replace) 2 per month.  Full Face Mask 1 every 3 months.  Full Face Mask Cushion 1 per month.  Nasal Cushion (Replace) 2 per month.  Nasal Pillows (Replace) 2 per month.  Nasal Interface Mask 1 every 3 months.  Headgear 1 every 6 months.  Chinstrap 1 every 6 months.  Tubing 1 every 3 months.  Filter(s) Disposable 2 per month.  Filter(s) Non-Disposable 1 every 6 months.  Oral Interface 1 every 3 months. [address] [facility] for [employer] (Replace) 1 every 6 months.  Tubing with heating element 1 every 3 months.                 Mary Ruiz MD, [facility]  Diplomate, American Board of Sleep Medicine  NPI [id number]  Electronically signed 5/11/20       * Patient has a history and examination consistent with the diagnosis of sleep apnea. * She was provided information on sleep apnea including corresponding risk factors and the importance of proper treatment. * Treatment options if indicated were reviewed today.     * Potential benefit of weight reduction Ovi Key MD, [facility]  Electronically signed 05/11/20    Pursuant to the emergency declaration under the [facility][note 3][facility], [facility] waiver authority and the [employer] and [employer] Act, this Virtual  Visit was conducted, with patient's consent, to reduce the patient's risk of exposure to COVID-19 and provide continuity of care for an established patient. Services were provided through a video synchronous discussion virtually to substitute for in-person clinic visit. Roni Carranza MD       This note was created using voice recognition software. Despite editing, there may be syntax errors. This note will not be viewable in [address]

## 2020-05-11 NOTE — PATIENT INSTRUCTIONS

## 2020-06-26 ENCOUNTER — TELEPHONE (OUTPATIENT)
Dept: SLEEP MEDICINE | Age: 80
End: 2020-06-26

## 2020-06-29 NOTE — TELEPHONE ENCOUNTER
Patient notes a recent \" popping\" sensation in ear. She will hold CPAP for several days and contact the office at that time.

## 2020-07-02 ENCOUNTER — HOSPITAL ENCOUNTER (EMERGENCY)
Age: 80
Discharge: HOME | End: 2020-07-02
Attending: EMERGENCY MEDICINE
Payer: COMMERCIAL

## 2020-07-02 VITALS
DIASTOLIC BLOOD PRESSURE: 72 MMHG | HEART RATE: 74 BPM | OXYGEN SATURATION: 94 % | RESPIRATION RATE: 16 BRPM | SYSTOLIC BLOOD PRESSURE: 147 MMHG | TEMPERATURE: 98 F

## 2020-07-02 DIAGNOSIS — K08.89 PAIN, DENTAL: Primary | ICD-10-CM

## 2020-07-02 PROCEDURE — 99282 EMERGENCY DEPT VISIT SF MDM: CPT

## 2020-07-02 RX ORDER — PENICILLIN V POTASSIUM 500 MG/1
500 TABLET, FILM COATED ORAL 4 TIMES DAILY
Qty: 28 TAB | Refills: 0 | Status: SHIPPED | OUTPATIENT
Start: 2020-07-02 | End: 2020-07-09

## 2020-07-02 NOTE — ED PROVIDER NOTES
Date of Service:  7/2/2020    Patient:  Kiley Araiza    Chief Complaint:  Gum Problem       HPI:  Kiley Araiza is a [de-identified] y.o.  female who presents for evaluation of pain in her gum. Patient states for 4 days she had pain in the back left inner portion of her gum. Nothing makes it better or worse she just feels that it is irritating her. She is concerned for a dental infection is going out of town tomorrow. She denies any increased pain with chewing or with hot or cold liquids. No electrical type discomfort. She is states is a localized implantable discomfort. No history of oral trauma. She denies any type of chest pain shortness of breath abdominal pain fevers chills sore throat trouble swallowing trouble eating or change in voice. Past Medical History:   Diagnosis Date    Bronchitis     Coronary atherosclerosis of native coronary artery     Essential hypertension, benign     Unspecified sleep apnea        Past Surgical History:   Procedure Laterality Date    ECHO 2D ADULT  2007    LVH, normal LV wall motion and ejection fraction, normal chamber dimensions, mild aortic regurgitation and mild mitral regurgitation. The ejection fraction was 60-65%    HX CHOLECYSTECTOMY      HX HEART CATHETERIZATION  2007    Left ventricular wall motion is normal. Ejection Fraction is estimated at 60%. The catheterization showed non-ostructive coronary artery disease. Minimal plaquing.  STRESS TEST MYOVIEW  2007    reversible defect(s) involving the anterior wall. Gated EF (%): 63         History reviewed. No pertinent family history.     Social History     Socioeconomic History    Marital status:      Spouse name: Not on file    Number of children: Not on file    Years of education: Not on file    Highest education level: Not on file   Occupational History    Not on file   Social Needs    Financial resource strain: Not on file    Food insecurity     Worry: Not on file     Inability: Not on file    Transportation needs     Medical: Not on file     Non-medical: Not on file   Tobacco Use    Smoking status: Never Smoker    Smokeless tobacco: Never Used   Substance and Sexual Activity    Alcohol use: No    Drug use: No    Sexual activity: Yes     Partners: Male   Lifestyle    Physical activity     Days per week: Not on file     Minutes per session: Not on file    Stress: Not on file   Relationships    Social connections     Talks on phone: Not on file     Gets together: Not on file     Attends Roman [employer] service: Not on file     Active member of club or organization: Not on file     Attends meetings of clubs or organizations: Not on file     Relationship status: Not on file    Intimate partner violence     Fear of current or ex partner: Not on file     Emotionally abused: Not on file     Physically abused: Not on file     Forced sexual activity: Not on file   Other Topics Concern    Not on file   Social History Narrative    Not on file         ALLERGIES: Patient has no known allergies. Review of Systems   Constitutional: Negative for fever. HENT: Positive for dental problem and mouth sores. Negative for drooling, facial swelling and sore throat. Respiratory: Negative for shortness of breath. Cardiovascular: Negative for chest pain. Gastrointestinal: Negative for abdominal pain. Neurological: Negative for headaches. Vitals:    07/02/20 1426   BP: 147/72   Pulse: 74   Resp: 16   Temp: 98 °F (36.7 °C)   SpO2: 94%            Physical Exam  Vitals signs and nursing note reviewed. Constitutional:       Appearance: Normal appearance. HENT:      Mouth/Throat:        Comments: Small area of erythema with a central punctate white area, most likely representing a small abscess with the head. It is flat in appearance. This is where she states she has her discomfort. No other oral lesions. Cardiovascular:      Rate and Rhythm: Normal rate. Pulses: Normal pulses.    Pulmonary: Effort: Pulmonary effort is normal.   Abdominal:      General: Abdomen is flat. Neurological:      Mental Status: She is alert and oriented to person, place, and time. Psychiatric:         Mood and Affect: Mood normal.          MDM  Number of Diagnoses or Management Options  Pain, dental:         VITAL SIGNS:  Patient Vitals for the past 4 hrs:   Temp Pulse Resp BP SpO2   07/02/20 1426 98 °F (36.7 °C) 74 16 147/72 94 %         LABS:  No results found for this or any previous visit (from the past 6 hour(s)). IMAGING:  No orders to display         Medications During Visit:  Medications - No data to display      DECISION MAKING:  Hi Harper is a [de-identified] y.o. female who comes in as above. There, physical exam reveals some poor dentition at baseline and a small area of flat erythematous area where she has discomfort. Is possible that this is a small developing abscess however it is so small that I do not feel it needs to be drained. At this time I will place the patient on penicillin and have her follow-up with her dentist.  Return instructions discussed      IMPRESSION:  1. Pain, dental        DISPOSITION:  Discharged      Current Discharge Medication List      START taking these medications    Details   penicillin v potassium (VEETID) 500 mg tablet Take 1 Tab by mouth four (4) times daily for 7 days. Qty: 28 Tab, Refills: 0              Follow-up Information     Follow up With Specialties Details Why Contact Info    Your Dentist  Schedule an appointment as soon as possible for a visit       Hebert Fernandes MD [address]  [phone]              The patient is asked to follow-up with their primary care provider in the next several days. They are to call tomorrow for an appointment. The patient is asked to return promptly for any increased concerns or worsening of symptoms.   They can return to this emergency department or any other emergency department.       Procedures

## 2020-07-02 NOTE — ED NOTES
The patient was discharged by  Dr. Yuan Murphy. One prescription given. Patient ambulatory to discharge.

## 2020-09-04 ENCOUNTER — HOSPITAL ENCOUNTER (OUTPATIENT)
Dept: GENERAL RADIOLOGY | Age: 80
Discharge: HOME | End: 2020-09-04
Attending: FAMILY MEDICINE
Payer: COMMERCIAL

## 2020-09-04 DIAGNOSIS — I10 ESSENTIAL HYPERTENSION: ICD-10-CM

## 2020-09-04 PROCEDURE — 73610 X-RAY EXAM OF ANKLE: CPT

## 2021-01-14 ENCOUNTER — APPOINTMENT (OUTPATIENT)
Dept: GENERAL RADIOLOGY | Age: 81
End: 2021-01-14
Attending: EMERGENCY MEDICINE
Payer: COMMERCIAL

## 2021-01-14 ENCOUNTER — HOSPITAL ENCOUNTER (EMERGENCY)
Age: 81
Discharge: HOME | End: 2021-01-14
Attending: EMERGENCY MEDICINE
Payer: COMMERCIAL

## 2021-01-14 VITALS
WEIGHT: 209.22 LBS | HEART RATE: 63 BPM | DIASTOLIC BLOOD PRESSURE: 70 MMHG | RESPIRATION RATE: 16 BRPM | SYSTOLIC BLOOD PRESSURE: 165 MMHG | TEMPERATURE: 98.6 F | OXYGEN SATURATION: 95 % | BODY MASS INDEX: 37.06 KG/M2

## 2021-01-14 DIAGNOSIS — M10.9 ACUTE GOUT OF LEFT HAND, UNSPECIFIED CAUSE: Primary | ICD-10-CM

## 2021-01-14 PROCEDURE — 73140 X-RAY EXAM OF FINGER(S): CPT

## 2021-01-14 PROCEDURE — 99283 EMERGENCY DEPT VISIT LOW MDM: CPT

## 2021-01-14 RX ORDER — PREDNISONE 50 MG/1
50 TABLET ORAL DAILY
Qty: 5 TAB | Refills: 0 | Status: SHIPPED | OUTPATIENT
Start: 2021-01-14 | End: 2021-01-19

## 2021-01-14 NOTE — ED PROVIDER NOTES
Date of Service:  1/14/2021    Patient:  Maria Del Carmen Dutta    Chief Complaint:  Finger Pain       HPI:  Maria Del Carmen Dutta is a [de-identified] y.o.  female who presents for evaluation of left thumb pain. Patient states that she woke up yesterday with some pain at the base of her left thumb. Erythema to the palmar surface. No trauma. No history of gout. No history of diabetes. Patient otherwise denies any other acute complaints. Pain is made worse with attempted movement of the thumb. Past Medical History:   Diagnosis Date    Bronchitis     Coronary atherosclerosis of native coronary artery     Essential hypertension, benign     Unspecified sleep apnea        Past Surgical History:   Procedure Laterality Date    ECHO 2D ADULT  2007    LVH, normal LV wall motion and ejection fraction, normal chamber dimensions, mild aortic regurgitation and mild mitral regurgitation. The ejection fraction was 60-65%    HX CHOLECYSTECTOMY      HX HEART CATHETERIZATION  2007    Left ventricular wall motion is normal. Ejection Fraction is estimated at 60%. The catheterization showed non-ostructive coronary artery disease. Minimal plaquing.  STRESS TEST MYOVIEW  2007    reversible defect(s) involving the anterior wall. Gated EF (%): 63         History reviewed. No pertinent family history.     Social History     Socioeconomic History    Marital status:      Spouse name: Not on file    Number of children: Not on file    Years of education: Not on file    Highest education level: Not on file   Occupational History    Not on file   Social Needs    Financial resource strain: Not on file    Food insecurity     Worry: Not on file     Inability: Not on file    Transportation needs     Medical: Not on file     Non-medical: Not on file   Tobacco Use    Smoking status: Never Smoker    Smokeless tobacco: Never Used   Substance and Sexual Activity    Alcohol use: No    Drug use: No    Sexual activity: Yes     Partners: Male Lifestyle    Physical activity     Days per week: Not on file     Minutes per session: Not on file    Stress: Not on file   Relationships    Social connections     Talks on phone: Not on file     Gets together: Not on file     Attends Confucianism service: Not on file     Active member of club or organization: Not on file     Attends meetings of clubs or organizations: Not on file     Relationship status: Not on file    Intimate partner violence     Fear of current or ex partner: Not on file     Emotionally abused: Not on file     Physically abused: Not on file     Forced sexual activity: Not on file   Other Topics Concern    Not on file   Social History Narrative    Not on file         ALLERGIES: Patient has no known allergies. Review of Systems   Constitutional: Negative for fever. Respiratory: Negative for shortness of breath. Cardiovascular: Negative for chest pain. Gastrointestinal: Negative for abdominal pain. Musculoskeletal: Positive for joint swelling. Skin: Positive for color change. Neurological: Negative for weakness and numbness. Vitals:    01/14/21 1300   BP: (!) 165/70   Pulse: 63   Resp: 16   Temp: 98.6 °F (37 °C)   SpO2: 95%   Weight: 94.9 kg (209 lb 3.5 oz)            Physical Exam  Vitals signs and nursing note reviewed. Constitutional:       Appearance: Normal appearance. HENT:      Head: Normocephalic and atraumatic. Cardiovascular:      Rate and Rhythm: Normal rate. Pulses: Normal pulses. Pulmonary:      Effort: Pulmonary effort is normal. No respiratory distress. Abdominal:      General: Abdomen is flat. Musculoskeletal:      Comments: Tenderness to the base of the left thumb. Skin:     General: Skin is warm. Capillary Refill: Capillary refill takes less than 2 seconds. Findings: Erythema present. Neurological:      Mental Status: She is alert and oriented to person, place, and time.    Psychiatric:         Mood and Affect: Mood normal. [facility]     VITAL SIGNS:  Patient Vitals for the past 4 hrs:   Temp Pulse Resp BP SpO2   01/14/21 1300 98.6 °F (37 °C) 63 16 (!) 165/70 95 %         LABS:  No results found for this or any previous visit (from the past 6 hour(s)). IMAGING:  XR THUMB LT MIN 2 V   Final Result   IMPRESSION: Soft tissue calcification associated with the first MCP joint could   represent gout or heterotopic ossification related to prior injury. No evidence   of fracture or dislocation. Medications During Visit:  Medications - No data to display      DECISION MAKING:  Krystal Dao is a [de-identified] y.o. female who comes in as above. Diagnostics as above. Thumb spica and steroids for gout. Patient to follow with PCP and return as needed. She is agreeable to all plans. All questions answered      IMPRESSION:  1. Acute gout of left hand, unspecified cause        DISPOSITION:  Discharged      Current Discharge Medication List      START taking these medications    Details   predniSONE (DELTASONE) 50 mg tablet Take 1 Tab by mouth daily for 5 days. Qty: 5 Tab, Refills: 0              Follow-up Information     Follow up With Specialties Details Why Contact Info    Adamaris Mayen MD Family Medicine Schedule an appointment as soon as possible for a visit   [address] [place] [address] Rd  [phone]              The patient is asked to follow-up with their primary care provider in the next several days. They are to call tomorrow for an appointment. The patient is asked to return promptly for any increased concerns or worsening of symptoms. They can return to this emergency department or any other emergency department.     Procedures

## 2021-01-14 NOTE — ED TRIAGE NOTES
Pt presents to ED with c/o pain at IP joint of left thumb since yesterday. Pt denies any hx of trauma. There is some erythema at the joint.

## 2021-05-10 ENCOUNTER — VIRTUAL VISIT (OUTPATIENT)
Dept: SLEEP MEDICINE | Age: 81
End: 2021-05-10
Payer: COMMERCIAL

## 2021-05-10 VITALS
BODY MASS INDEX: 37.21 KG/M2 | WEIGHT: 210 LBS | DIASTOLIC BLOOD PRESSURE: 80 MMHG | SYSTOLIC BLOOD PRESSURE: 145 MMHG | HEIGHT: 63 IN

## 2021-05-10 DIAGNOSIS — G47.33 OSA (OBSTRUCTIVE SLEEP APNEA): Primary | ICD-10-CM

## 2021-05-10 PROCEDURE — 99212 OFFICE O/P EST SF 10 MIN: CPT | Performed by: SPECIALIST

## 2021-05-10 NOTE — PATIENT INSTRUCTIONS
Sleep Apnea: Care Instructions Overview Sleep apnea means that you frequently stop breathing for 10 seconds or longer during sleep. It can be mild to severe, based on the number of times an hour that you stop breathing or have slowed breathing. Blocked or narrowed airways in your nose, mouth, or throat can cause sleep apnea. Your airway can become blocked when your throat muscles and tongue relax during sleep. You can help treat sleep apnea at home by making lifestyle changes. You also can use a CPAP breathing machine that keeps tissues in the throat from blocking your airway. Or your doctor may suggest that you use a breathing device while you sleep. It helps keep your airway open. This could be a device that you put in your mouth. In some cases, surgery may be needed to remove enlarged tissues in the throat. Follow-up care is a key part of your treatment and safety. Be sure to make and go to all appointments, and call your doctor if you are having problems. It's also a good idea to know your test results and keep a list of the medicines you take. How can you care for yourself at home? · Lose weight, if needed. · Sleep on your side. It may help mild apnea. · Avoid alcohol and medicines such as sleeping pills, opioids, or sedatives before bed. · Don't smoke. If you need help quitting, talk to your doctor. · Prop up the head of your bed. · Treat breathing problems, such as a stuffy nose, that are caused by a cold or allergies. · Try a continuous positive airway pressure (CPAP) breathing machine if your doctor recommends it. · If CPAP doesn't work for you, ask your doctor if you can try other masks, settings, or breathing machines. · Try oral breathing devices or other nasal devices. · Talk to your doctor if your nose feels dry or bleeds, or if it gets runny or stuffy when you use a breathing machine. · Tell your doctor if you're sleepy during the day and it affects your daily life.  Don't drive or operate machinery when you're drowsy. When should you call for help? Watch closely for changes in your health, and be sure to contact your doctor if: 
  · You still have sleep apnea even though you have made lifestyle changes.  
  · You are thinking of trying a device such as CPAP.  
  · You are having problems using a CPAP or similar machine.  
  · You are still sleepy during the day, and it affects your daily life. Where can you learn more? Go to http://www.gray.com/ Enter J475 in the search box to learn more about \"Sleep Apnea: Care Instructions. \" Current as of: October 26, 2020               Content Version: 12.8 © 1930-9770 [employer]. Care instructions adapted under license by [employer] (which disclaims liability or warranty for this information). If you have questions about a medical condition or this instruction, always ask your healthcare professional. [place] [address] any warranty or liability for your use of this information.

## 2021-05-10 NOTE — PROGRESS NOTES
[address] [facility]. [place], [address]  Tel.  [phone]  Fax. [address] [facility] [address]  [place], [address] [facility]  Tel.  [phone]  Fax. [phone] [address] [facility] Slava Guzman   Tel.  [phone]  Fax. [phone]       Natalie Turpin is a 80 y.o. female who was seen by synchronous (real-time) audio-video technology on 5/10/2021. Consent:  She and/or her healthcare decision maker is aware that this patient-initiated Telehealth encounter is a billable service, with coverage as determined by her insurance carrier. She is aware that she may receive a bill and has provided verbal consent to proceed: Yes    I was in the office while conducting this encounter. Chief Complaint       Chief Complaint   Patient presents with    Sleep Problem     Consent to [facility] appt in [place] _ send link to [phone]_yearly follow up          HPI        Natalie Turpin is a 80 y.o. female seen for follow-up. She was initially [facility].  Diagnostic study demonstrated overall AHI of 8.1/h associated with marked arterial desaturations to 59%.  During a second study, CPAP was employed at 15 cm.  Corresponding AHI 3.1/h.  Events were more prominent in rem sleep and associated with significant arterial desaturations.  The patient was reevaluated with a polysomnogram demonstrating RDI of 65.1/h; REM related AHI 60/h, minimal SaO2 82%.  At 14 cm CPAP corresponding AHI 0.9/h.  Oral venting was observed.  Most recently CPAP had been at 12 cm.     Unit was upgraded. Compliance data downloaded and reviewed in detail with the patient today. During the past 30 days, CPAP used during 28 days with the average daily use of 6.4 hours. CMS compliance criteria 70%. AHI 1.7 per hour. She notes she is doing well with CPAP without excessive daytime fatigue.      No Known Allergies    Current Outpatient Medications   Medication Sig Dispense Refill    ubidecarenone (COQ-10 PO) Take  by mouth daily.  ascorbate calcium (VITAMIN C PO) Take  by mouth daily.  amLODIPine (NORVASC) 2.5 mg tablet Take 1 Tab by mouth daily. (Patient taking differently: Take 2.5 mg by mouth every seven (7) days.) 90 Tab 3    GLUCOSAMINE HCL/CHONDR STONER A NA (OSTEO BI-FLEX PO) Take  by mouth as needed.  Carboxymethylcellulose-Glycern (REFRESH OPTIVE) 0.5-0.9 % drop Apply  to eye. instill 2-3 times daily in both eyes      diclofenac (VOLTAREN) 1 % topical gel Apply 4 g to affected area three (3) times daily as needed.  sod mfp-glu wic-cofdpvjt-cyyue (BIOTENE) tpst by Dental route.  cpap machine kit by Does Not Apply route.  multivitamin capsule Take 1 Cap by mouth daily.  aspirin 81 mg chewable tablet Take 81 mg by mouth daily.  rosuvastatin (CRESTOR) 10 mg tablet Take 1 Tab by mouth nightly. (Patient taking differently: Take 10 mg by mouth every seven (7) days.) 90 Tab 3        She  has a past medical history of Bronchitis, Coronary atherosclerosis of native coronary artery, Essential hypertension, benign, and Unspecified sleep apnea. She  has a past surgical history that includes stress test myoview (2007); echo 2d adult (2007); hx heart catheterization (2007); and hx cholecystectomy. She family history is not on file. She  reports that she has never smoked. She has never used smokeless tobacco. She reports that she does not drink alcohol or use drugs. Review of Systems:  Unchanged per patient    Due to this being a telemedicine evaluation, certain elements of the physical examination are unable to be assessed. Objective:     Visit Vitals  BP (!) 145/80   Ht 5' 3\" (1.6 m)   Wt 210 lb (95.3 kg)   BMI 37.20 kg/m²     Body mass index is 37.2 kg/m².      General:   Conversant, cooperative   Eyes:  no nystagmus   Oropharynx:   Mallampati score IV, tongue normal                         Neuro:  Speech fluent, face symmetrical             Assessment:       ICD-10-CM ICD-9-CM    1. GEMMA (obstructive sleep apnea)  G47.33 327.23 AMB SUPPLY ORDER     Sleep disordered breathing , prominent in REM sleep, responding to CPAP. She will continue at the current pressure settings. she is compliant with PAP therapy and PAP continues to benefit patient and remains necessary for control of her sleep apnea. Plan:     Orders Placed This Encounter    AMB SUPPLY ORDER     Diagnosis: Obstructive Sleep Apnea ICD-10 Code (G47.33)      CPAP mask and supplies:   Patient preference, headgear, heated tubing, and filter;  heated humidifier. Wireless modem. Remote monitoring enrollment.  Oral/Nasal Combo Mask 1 every 3 months.  Oral Cushion Combo Mask (Replace) 2 per month.  Nasal Pillows Combo Mask (Replace) 2 per month.  Full Face Mask 1 every 3 months.  Full Face Mask Cushion 1 per month.  Nasal Cushion (Replace) 2 per month.  Nasal Pillows (Replace) 2 per month.  Nasal Interface Mask 1 every 3 months.  Headgear 1 every 6 months.  Chinstrap 1 every 6 months.  Tubing 1 every 3 months.  Filter(s) Disposable 2 per month.  Filter(s) Non-Disposable 1 every 6 months.  Oral Interface 1 every 3 months. [address] for [employer] (Replace) 1 every 6 months.  Tubing with heating element 1 every 3 months.                 Ari Cabral MD, Michael Nicole  Diplomate, American Board of Sleep Medicine  NPI [id number]  Electronically signed 5/10/21       *A copy of compliance data was provided to the patient and reviewed in detail. *CPAP will be  continued at the above pressure settings. The patient is to contact the office if there are problems with either mask or pressure settings. Follow-up will be scheduled at which time compliance data will be reviewed. * Patient has a history and examination consistent with the diagnosis of sleep apnea.   * She was provided information on sleep apnea including corresponding risk factors and the importance of proper treatment. * Treatment options if indicated were reviewed today. Ben Najera MD, [facility]  Electronically signed 05/10/21    Pursuant to the emergency declaration under the [facility], [facility] waiver authority and the [employer] Act, this Virtual  Visit was conducted, with patient's consent, to reduce the patient's risk of exposure to COVID-19 and provide continuity of care for an established patient. Services were provided through a video synchronous discussion virtually to substitute for in-person clinic visit. Yolanda Wallace MD       This note was created using voice recognition software. Despite editing, there may be syntax errors. This note will not be viewable in [address]

## 2021-05-11 ENCOUNTER — DOCUMENTATION ONLY (OUTPATIENT)
Dept: SLEEP MEDICINE | Age: 81
End: 2021-05-11